# Patient Record
Sex: FEMALE | Race: WHITE | NOT HISPANIC OR LATINO | Employment: FULL TIME | URBAN - METROPOLITAN AREA
[De-identification: names, ages, dates, MRNs, and addresses within clinical notes are randomized per-mention and may not be internally consistent; named-entity substitution may affect disease eponyms.]

---

## 2017-12-31 ENCOUNTER — TRANSFERRED RECORDS (OUTPATIENT)
Dept: HEALTH INFORMATION MANAGEMENT | Facility: CLINIC | Age: 27
End: 2017-12-31

## 2017-12-31 LAB — PAP SMEAR - HIM PATIENT REPORTED: NEGATIVE

## 2018-12-03 ENCOUNTER — OFFICE VISIT (OUTPATIENT)
Dept: FAMILY MEDICINE | Facility: CLINIC | Age: 28
End: 2018-12-03
Payer: COMMERCIAL

## 2018-12-03 ENCOUNTER — RADIANT APPOINTMENT (OUTPATIENT)
Dept: ULTRASOUND IMAGING | Facility: CLINIC | Age: 28
End: 2018-12-03
Attending: NURSE PRACTITIONER
Payer: COMMERCIAL

## 2018-12-03 VITALS
DIASTOLIC BLOOD PRESSURE: 56 MMHG | OXYGEN SATURATION: 100 % | RESPIRATION RATE: 18 BRPM | TEMPERATURE: 98.9 F | HEART RATE: 87 BPM | SYSTOLIC BLOOD PRESSURE: 110 MMHG | HEIGHT: 65 IN | WEIGHT: 127 LBS | BODY MASS INDEX: 21.16 KG/M2

## 2018-12-03 DIAGNOSIS — N92.6 IRREGULAR MENSES: ICD-10-CM

## 2018-12-03 DIAGNOSIS — R22.2 LUMP OF SKIN OF BACK: Primary | ICD-10-CM

## 2018-12-03 DIAGNOSIS — R22.2 LUMP OF SKIN OF BACK: ICD-10-CM

## 2018-12-03 DIAGNOSIS — Z86.718 HISTORY OF DEEP VENOUS THROMBOSIS: ICD-10-CM

## 2018-12-03 PROCEDURE — 76705 ECHO EXAM OF ABDOMEN: CPT | Performed by: STUDENT IN AN ORGANIZED HEALTH CARE EDUCATION/TRAINING PROGRAM

## 2018-12-03 PROCEDURE — 99203 OFFICE O/P NEW LOW 30 MIN: CPT | Performed by: NURSE PRACTITIONER

## 2018-12-03 RX ORDER — DIPHENOXYLATE HYDROCHLORIDE AND ATROPINE SULFATE 2.5; .025 MG/1; MG/1
1 TABLET ORAL
Status: ON HOLD | COMMUNITY
Start: 2014-09-09 | End: 2024-08-28

## 2018-12-03 ASSESSMENT — PAIN SCALES - GENERAL: PAINLEVEL: MILD PAIN (3)

## 2018-12-03 NOTE — MR AVS SNAPSHOT
"              After Visit Summary   12/3/2018    Raven Puga    MRN: [id number]           Patient Information     Date Of Birth          1990        Visit Information        Provider Department      12/3/2018 12:00 PM Jagruti Sandoval NP [facility]        Today's Diagnoses     Lump of skin of back    -  1      Care Instructions    [phone]          Follow-ups after your visit        Future tests that were ordered for you today     Open Future Orders        Priority Expected Expires Ordered    US Head Neck Soft Tissue Routine  12/3/2019 12/3/2018            Who to contact     If you have questions or need follow up information about today's clinic visit or your schedule please contact [facility] directly at [phone].  Normal or non-critical lab and imaging results will be communicated to you by MyChart, letter or phone within 4 business days after the clinic has received the results. If you do not hear from us within 7 days, please contact the clinic through MyChart or phone. If you have a critical or abnormal lab result, we will notify you by phone as soon as possible.  Submit refill requests through [employer] or call your pharmacy and they will forward the refill request to us. Please allow 3 business days for your refill to be completed.          Additional Information About Your Visit        [facility] Information     [employer] lets you send messages to your doctor, view your test results, renew your prescriptions, schedule appointments and more. To sign up, go to [email or web address] . Click on \"Log in\" on the left side of the screen, which will take you to the Welcome page. Then click on \"Sign up Now\" on the right side of the page.     You will be asked to enter the access code listed below, as well as some personal information. Please follow the directions to create your username and password.     Your access code is: [email or web address]  Expires: 3/3/2019 12:50 PM     Your " "access code will  in 90 days. If you need help or a new code, please call your [facility] clinic or [phone].        Care EveryWhere ID     This is your Care EveryWhere ID. This could be used by other organizations to access your [facility] medical records  [id number]        Your Vitals Were     Pulse Temperature Respirations Height Pulse Oximetry BMI (Body Mass Index)    87 98.9  F (37.2  C) (Oral) 18 1.651 m (5' 5\") 100% 21.13 kg/m2       Blood Pressure from Last 3 Encounters:   18 110/56    Weight from Last 3 Encounters:   18 57.6 kg (127 lb)               Primary Care Provider Office Phone # Fax #    Nikolay Keen [phone]       [facility] [address]  [place] [address]        Equal Access to Services     [facility]: Hadii aad ku hadasho Soomaali, waaxda luqadaha, qaybta kaalmada adeegyada, waxay idiin hayaan adechrissy gerardarahai hendricksaan . So [facility] [phone].    ATENCIÓN: Si habla español, tiene a perez disposición servicios gratuitos de asistencia lingüística. Dorothy al [phone].    We comply with applicable federal civil rights laws and [place] laws. We do not discriminate on the basis of race, color, national origin, age, disability, sex, sexual orientation, or gender identity.            Thank you!     Thank you for choosing [facility]  for your care. Our goal is always to provide you with excellent care. Hearing back from our patients is one way we can continue to improve our services. Please take a few minutes to complete the written survey that you may receive in the mail after your visit with us. Thank you!             Your Updated Medication List - Protect others around you: Learn how to safely use, store and throw away your medicines at www.disposemymeds.org.          This list is accurate as of 12/3/18 12:50 PM.  Always use your most recent med list.                   Brand Name Dispense Instructions for use Diagnosis    " MULTI-VITAMINS Tabs      Take 1 tablet by mouth

## 2018-12-03 NOTE — Clinical Note
Please abstract the following data from this visit with this patient into the appropriate field in Epic:  Pap smear done on this date: 12/31/17 (approximately), by this group: [facility], results were normal.

## 2018-12-03 NOTE — PROGRESS NOTES
SUBJECTIVE:   Raven Puga is a 28 year old female who presents to clinic today for the following health issues:      Concern - lump in lower back to the LT of spine  Onset: 2-3 days ago    Description:   Dime sized lump on the LT side of spine, low back    Intensity: mild, moderate    Progression of Symptoms:  worsening    Accompanying Signs & Symptoms:  pain    Previous history of similar problem:   no    Precipitating factors:   Worsened by: unsure    Alleviating factors:  Improved by: nothing    Therapies Tried and outcome: nothing      Hx of blood clots in the past. On in leg and one in brain. Has had work up and everything was negative. Last one was in 2015 in brain. Was noted to be maybe hormone related. Noted lump in back, wants to be sure not blood clot. We talked how it could be simple cyst or lipoma, will get US.     Menses was very irregular. Went off birth control 6 months ago and no menses. Has not had period on her own since she was 17. First was on Patti, then low dose estrogen pill, then got her 2nd blood clot. Went off. Her gynecologist put her on IUD with just progesterone for a few years. Then took it out recently. Can sometimes notice small changes monthly with bloating, or vaginal discharge. Had an ultrasound, cysts on ovaries. She is looking to start getting pregnant after getting  next May 2019. We talked about her tracking her symptoms and if no menses in a few months return for medication help and possible further testing.     2010: thought was fluttering, wore heart monitor, was negative. Still has some palpitations at times.         Problem list and histories reviewed & adjusted, as indicated.  Additional history: as documented    Patient Active Problem List   Diagnosis     History of deep venous thrombosis     Irregular menses     History reviewed. No pertinent surgical history.    Social History   Substance Use Topics     Smoking status: Never Smoker     Smokeless tobacco:  "Never Used     Alcohol use Yes     History reviewed. No pertinent family history.      Current Outpatient Prescriptions   Medication Sig Dispense Refill     Multiple Vitamin (MULTI-VITAMINS) TABS Take 1 tablet by mouth       Allergies   Allergen Reactions     Penicillins Hives     Sulfa Drugs Hives       Reviewed and updated as needed this visit by clinical staff  Tobacco  Allergies  Meds  Problems  Med Hx  Surg Hx  Fam Hx  Soc Hx        Reviewed and updated as needed this visit by Provider  Allergies  Meds  Problems         ROS:  Constitutional,, cardiovascular, pulmonary, MS, back, otherwise systems are negative, except as otherwise noted.    OBJECTIVE:     /56 (BP Location: Right arm, Patient Position: Sitting, Cuff Size: Adult Regular)  Pulse 87  Temp 98.9  F (37.2  C) (Oral)  Resp 18  Ht 1.651 m (5' 5\")  Wt 57.6 kg (127 lb)  SpO2 100%  BMI 21.13 kg/m2  Body mass index is 21.13 kg/(m^2).  GENERAL: healthy, alert and no distress  RESP: lungs clear to auscultation - no rales, rhonchi or wheezes  CV: regular rate and rhythm, normal S1 S2, no S3 or S4, no murmur, click or rub  MS: no gross musculoskeletal defects noted  BACK: no CVA tenderness, no paralumbar tenderness  Skin: lower lumber, slightly left of spine very small, less than pea sized nodule slightly tender, firm to touch    Diagnostic Test Results:  none     ASSESSMENT/PLAN:         1. Lump of skin of back  Get US of back, make sure not DVT  - US Head Neck Soft Tissue; Future    2. Irregular menses  Monitor when feeling symptomatic. If no period in few months return for medication, possible US and/or labs. May need referral to OB    3. History of deep venous thrombosis  In leg and brain, last in 2015. Not on birth control now, not on anticoagulation at this time      FUTURE APPOINTMENTS:       - Follow-up visit in 1-2 months alexn    WELLINGTON Ortiz, NP-C  [facility]    "

## 2018-12-04 ENCOUNTER — TELEPHONE (OUTPATIENT)
Dept: FAMILY MEDICINE | Facility: CLINIC | Age: 28
End: 2018-12-04

## 2018-12-04 NOTE — TELEPHONE ENCOUNTER
This writer attempted to contact Raven on 12/04/18      Reason for call ultrasound results and left detailed message.      If patient calls back:   Send to NP if further questions.         WELLINGTON Ortiz, NP-C  [facility]

## 2021-06-02 LAB
HEPATITIS B SURFACE ANTIGEN (EXTERNAL): NEGATIVE
HIV1+2 AB SERPL QL IA: NEGATIVE
VDRL (SYPHILIS) (EXTERNAL): NONREACTIVE

## 2021-06-10 LAB — RUBELLA ANTIBODY IGG (EXTERNAL): NORMAL

## 2021-07-01 ENCOUNTER — TRANSFERRED RECORDS (OUTPATIENT)
Dept: HEALTH INFORMATION MANAGEMENT | Facility: CLINIC | Age: 31
End: 2021-07-01

## 2021-07-02 ENCOUNTER — MEDICAL CORRESPONDENCE (OUTPATIENT)
Dept: HEALTH INFORMATION MANAGEMENT | Facility: CLINIC | Age: 31
End: 2021-07-02

## 2021-07-02 ENCOUNTER — TRANSCRIBE ORDERS (OUTPATIENT)
Dept: MATERNAL FETAL MEDICINE | Facility: CLINIC | Age: 31
End: 2021-07-02

## 2021-07-02 DIAGNOSIS — O26.90 PREGNANCY RELATED CONDITION, ANTEPARTUM: Primary | ICD-10-CM

## 2021-07-06 ENCOUNTER — TELEPHONE (OUTPATIENT)
Dept: MATERNAL FETAL MEDICINE | Facility: CLINIC | Age: 31
End: 2021-07-06

## 2021-07-06 NOTE — TELEPHONE ENCOUNTER
Phone call back from pt re record request for consult. RANJANA set to pt. Pt aware once RANJANA is returned and records are obtained she will be called to scheduled an apt. States understanding. No further questions at this time. Maryam Bah RN

## 2021-07-06 NOTE — TELEPHONE ENCOUNTER
Phone call with message left for pt to call back re consult request. Need more information and records prior to scheduling her for consult. Left [facility] number to call back. Maryam Bah RN

## 2021-07-12 ENCOUNTER — TRANSCRIBE ORDERS (OUTPATIENT)
Dept: MATERNAL FETAL MEDICINE | Facility: CLINIC | Age: 31
End: 2021-07-12

## 2021-07-12 DIAGNOSIS — O26.90 PREGNANCY RELATED CONDITION, ANTEPARTUM: Primary | ICD-10-CM

## 2021-07-23 ENCOUNTER — PRE VISIT (OUTPATIENT)
Dept: MATERNAL FETAL MEDICINE | Facility: CLINIC | Age: 31
End: 2021-07-23

## 2021-07-23 DIAGNOSIS — Z78.9 CONCEIVED BY IN VITRO FERTILIZATION: ICD-10-CM

## 2021-07-24 NOTE — PROGRESS NOTES
Maternal-Fetal Medicine Consultation    Raven Puga  : 1990  MRN: [id number]    REFERRAL:  Raven Puga is a 31 year old sent by Dr. Singh from [facility] for MFM consultation.    HPI:  Raven Puga is a 31 year old  at 18w2d by IVF dating here for MFM consultation regarding history of dural sinus thrombosis and IVF pregnancy.    She is here with her , Salvatore.    Patient has a history of dural sinus thrombosis diagnosed 2014.  She presented with acute onset frontal headache after vigorous exercise.  She presented to [facility] in [facility], where she was studying physical therapy.  She underwent CT had and CTA, which showed dural sinus thrombosis, involving transverse sinus, torcula, straight sinus and superior sagittal sinus.  At this time, she was also noted to be on combined OCPs.  Given the extensive clot burden, she was admitted to the ICU and started on a heparin drip, which was later transitioned to warfarin.  She was evaluated by neurosurgery as well as hematology inpatient, and followed up as an outpatient.  She underwent thrombophilia testing that was negative for protein C, lupus anticoagulant, b2 glycoprotein, anticardiolipin, factor V Leiden, and prothrombin mutation. She also underwent antithrombin III testing which was mildly low at 80%.  She was treated for a total of 6 months of anticoagulation with warfarin, however, no lifelong anticoagulation was recommended.  She denies any other history significant for venous thromboembolism, though previously had a history of superficial clot in the lower extremity for which anticoagulation was not indicated.  Given the pregnancy and her history, she is currently on prophylactic Lovenox 40 mg daily.    This pregnancy was achieved via in vitro fertilization using her egg and her partner sperm.  The transfer to embryo underwent PGS, which was normal per her report. They also both underwent  carrier screening, which was normal. They underwent NIPT, which was low risk.     Prenatal Care:  Primary OB care this pregnancy has been with Dr. Singh    Obstetrics History:  OB History    Para Term  AB Living   1 0 0 0 0 0   SAB TAB Ectopic Multiple Live Births   0 0 0 0 0      # Outcome Date GA Lbr Jamaal/2nd Weight Sex Delivery Anes PTL Lv   1 Current                Past Medical History:  Past Medical History:   Diagnosis Date     Anemia, iron deficiency      Dural sinus thrombosis      Hypothyroidism        Past Surgical History:  Past Surgical History:   Procedure Laterality Date     Egg retrieval       HYSTEROSCOPY       WISDOM TOOTH EXTRACTION         Current Medications:  Prior to Admission medications    Medication Sig Last Dose Taking? Auth Provider   Multiple Vitamin (MULTI-VITAMINS) TABS Take 1 tablet by mouth   Reported, Patient       Allergies:  Penicillins and Sulfa drugs    Social History:   Occupation: Physical therapist at [employer]  Status:  to Salvatore  Denies use of alcohol, drugs or smoking.    Family History:  Paternal grandfather- lung cancer  Paternal grandmother-prostate cancer  Denies history of genetic disorders, preeclampsia, thromboembolic disease, bleeding disorders, developmental delay.    ROS:  10-point ROS negative except as in HPI     PHYSICAL EXAM:  /52   Pulse 67   Resp 16   LMP 2020   SpO2 100%   Deferred     Blood type A+, Antibody negative  Hgb 12.36  MCV 91  Plts 230  HBSAg NR  Treponemal Ab NR  HIV NR  Gonorrhea neg  Chlamydia neg  NIPT low risk      ASSESSMENT/PLAN:  Raven Puga is a 31 year old  at 18w2d by IVF here for MFM consultation regarding:     History of Dural Sinus Thrombosis  Pregnant and postpartum women have up to a five-fold increased risk for venous thromboembolism (VTE); they have higher rates of stasis, hypercoagulability and vascular damage (Virchow Triad). Decreased mobility may also play a role.   The  prevention of deep vein thrombosis (DVT) is paramount because pulmonary embolism (PE) is a leading cause of maternal death, responsible for 9% of maternal deaths.  The most important risk factor for VTE in pregnancy is a personal history of VTE, and up to 25% of cases of VTE in pregnancy are recurrent events.  The second most important risk factor is the presence of a thrombophilia, with up to 50% of women experiencing VTE in pregnancy having a thrombophilia.  ACOG recommends that all women with a VTE be evaluated for both antiphospholipid antibody syndrome and inherited thrombophilias.  These results might  of a future pregnancy.      Women with a history of VTE that is estrogen or pregnancy related or was idiopathic (i.e. unprovoked), in the absence of a thrombophilia, are managed with prophylactic anticoagulation during pregnancy and for 6 weeks postpartum.  For most patients enoxaparin 40 mg daily is sufficient. We discussed that in Ms. 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 We discussed that in this situation, options for prophylaxis include prophylactic, intermediate and therapeutic dosing of Lovenox during the pregnancy and postpartum. She is currently on prophylactic dosing of Lovenox and feels comfortable continuing with this dosing, which is reasonable.     Most expert recommend waiting 10-12 hours after a dose of LMWH before placing regional anesthesia.  Similarly, a dose should be given no sooner than four hours after removal of an epidural and 12 hours after the regional anesthesia placement.  The exact timing should be discussed with the anesthesiologist performing the procedure. From an obstetrics stand point, assuming no significant bleeding anticoagulation can usually be resumed 12 hours after a  birth or six hours after a vaginal birth.  Again, pneumatic compression devices are  recommended.  Breastfeeding is safe on enoxaparin.  Avoidance of estrogen containing birth control is recommended.      In-Vitro Fertilization  The use of IVF, with and without ICSI, has been associated with an increased risk for congenital anomalies (specifically cardiac), hypertensive disorders, preeclampsia, placental abruption, placenta previa and other placental abnormalities, small for gestational age and  delivery.     ICSI has also been associated with imprinting disorders, such as Erika-Wiedemann and Angelman's Syndromes, however there are no studies confirming these concerns as a causal relationship.     While the association between IVF and these adverse outcomes raise some concern, it is important to note that the literature is contradictory on this subject and the chances of having a healthy child conceived with ART are overall extremely high.    Hypothyroidism  Thyroid requirements increase in pregnancy due to increases in metabolism and thyroid binding globulin.  Thyroid function tests, in particular TSH, should be followed closely to ensure adequate treatment.  Hypothyroidism has been associated with higher pregnancy complication rates including miscarriage, preeclampsia, abruption, low birth weight and stillbirth.  Untreated hypothyroidism has also been associated with adverse fetal neurological development, but well treated hypothyroidism (ie euthyroid state) carries an excellent prognosis for mother and baby.      Recommendations:  History of Dural Sinus Thrombosis    After discussion of management options in pregnancy, patient elects to continue enoxaparin 40 mg daily     Continuation of LMWH for at least 6 weeks postpartum    Consultation with anesthesiology if regional anesthesia desired    Sequential compression devices until the patient is fully ambulatory and has resumed anticoagulation therapy    Prompt evaluation if new symptoms concern for VTE present    Estrogen containing  contraceptives are not advised in this patient given her history.    In-Vitro Fertilization    We recommend offering routine aneuploidy screening regardless of whether PGS is completed    Fetal echocardiogram at 22 weeks    Close monitoring for preeclampsia    Fetal testing and timing of delivery per routine obstetric guidelines    Recommend low dose aspirin for preeclampsia prophylaxis, given IVF pregnancy and nulliparity. Can be started as soon as possible, but prior to 28 weeks' gestation.     Hypothyroidism    Maintain euthyroidism via treatment with levothyroxine, aimed at keeping the TSH at the desired range by trimester; dose requirements may increase by as much as 30-50%.       TSH should be checked q4-6 weeks in the first half of pregnancy and adjusted to trimester specific norms; it can be checked less often in the second half of pregnancy as long as dose adjustments are not necessary.      First trimester: 0.1 to 2.5 mIU/L    Second trimester: 0.2 to 3 mIU/L    Third trimester: 0.3 to 3 mIU/L     fetal testing and timing of delivery per routine obstetric guidelines if euthyroidism is maintained    After delivery levothyroxine can be reduced to pre-pregnancy levels, but serum TSH should be monitored 4-6 weeks later to confirm appropriate dosing.      The patient was seen and evaluated with Dr. Perez.    Thank you for allowing us to participate in the care of your patient. Please do not hesitate to contact us if you have further questions regarding the management of your patient.       Janak Sylvester MD  Maternal Fetal Medicine Fellow    [facility] Attending Attestation  I have seen and evaluated the patient with Dr. Sylvester. I reviewed her imaging/labs and chart and agree with the above documented assessment and plan.  I spent a total of 60 minutes face-to-face with Ms. Matt and her partner during today's office visit. Over 50% of this time was spent counseling the patient and/or coordinating care.  Please see her note for specific details; I have made the necessary edits/additions.  The patient was also seen for an ultrasound in the [facility] at the [facility] today.  For a detailed report of the ultrasound examination, please see the ultrasound report which can be found under the imaging tab.  Rosa Perez MD  Maternal Fetal Medicine Physician

## 2021-07-26 NOTE — PROGRESS NOTES
[facility]  Genetic Counseling Consult    Patient:  Raven Puga YOB: 1990   Date of Service:  21      Raven Puga was seen at the [facility] for genetic consultation as part of her MFM consultation appointment for comprehensive ultrasound and evaluation. The indication for genetic counseling is IVF pregnancy with history dural sinus thrombosis. Raven was accomapnied to today's consult by her , Salvatore.       Impression/Plan:   1. Raven had a cell-free fetal DNA test earlier in pregnancy, which is reportedly normal. A copy of the results were not available for our review in person today, however Raven reports they were low risk, female. We reviewed the option of proceeding with genetic amniocentesis in the current pregnancy. Raven declined today.     2. Raven had a comprehensive (level II) ultrasound today.  Please see the ultrasound report for further details.    3. Raven had an MFM consult today. Please see Dr. Janak Sylvester and Dr. Rosa Perez's documentation for details regarding pregnancy management and recommendation for Raven.     Pregnancy History:   /Parity:    Age at Delivery: 31 year old  ELI: 2021, by Ultrasound  Gestational Age: 18w2d    No significant complications or exposures were reported in the current pregnancy.    Raven current takes lovenox, prenatal vitamins, magnesium, and levothyroxine in pregnancy    Raven's pregnancy was conceived via frozen day 5 embryo transfer on 21 using her egg and her partner's sperm. Raven Puga was 30years old when her egg was retrieved. The embryo did undergo preimplantation genetic screening for aneuploidy (PGT-A) prior to transfer. 7 of the 11 embryos the couple had underwent PGT-A and were found to be euploid (this pregnancy included- 46, XX).    o This is the first pregnancy for Raven and Salvatore  o We discussed that fetal  echocardiograms are typically recommended for In Vitro Fertilization (IVF) pregnancies. The average pregnancy has a 0.5-1.0% chance of a congenital heart defect. However, studies suggest an increased chance for a heart defect for IVF pregnancies, with estimates ranging from 1-3.3%. Fetal echocardiograms are typically performed at 20 to 24 weeks gestation.  Raven verbalized understanding.     Medical History:   Raven robbins reported medical history is significant for dural sinus thrombosis diagnosed in 2014. She was treated for six months with Warfarin, however since no diagnosis of a thrombophilia was formerly made, she was discontinued on this medication. Currently, Raven takes Lovenox in pregnancy. Also, at age 21, Raven was found to have a superficial DVT in her left calf while she was on birth control. She reports that physicians were not concerned regarding this history and she was not actively treated for it at the time.        Family History:   A three-generation pedigree was obtained, and is scanned under the  Media  tab.     The following significant findings were reported by Raven and Salvatore:    Salvatore: he is 31 years old and in good health. He has no children with previous partners.     Salvatore's mother: diagnosed with breast cancer in her 40s, she is currently 60 years old and cancer-free. She may have undergone genetic testing, however the couple are not aware of the results.     Salvatore's maternal first-cousin: diagnosed with ovarian cancer at age 25, she is alive and well at age 28 today s/p surgical resection. Raven and Salvatore confirmed today that her cancer was ovarian in origin, and not cervical or endometrial cancer. She has not undergone genetic testing to the best of the couple's knowledge.     Salvatore's maternal grandmother: diagnosed with breast cancer in her 60s.     Otherwise, the reported family history is negative for multiple miscarriages, stillbirths, birth defects, intellectual disabilities, known genetic  "conditions, and consanguinity.    We briefly discussed the family history of cancer. Cancer most often occurs by chance, however some families seem to develop cancer more frequently than expected. Everyone has a risk to develop cancer, but individuals may be at an increased risk to develop cancer based on their family history. We discussed that early onset ovarian and breast cancer can be associated with inherited cancer predisposition syndromes. Genetic counseling is available for cancer syndromes. Cancer family history, even without genetic testing, can change cancer screening recommendations for family members and aid in insurance coverage for access to them as well. The most informative individuals to complete cancer genetic counseling and genetic testing are those with a personal history of cancer or those closely related to the affected individuals. If the family wants more information they can contact the [facility] Cancer Risk Management Program (1-437.566.5915). Physicians can also make referrals at [email or web address]-program or, if within the [employer] system, through [facility] referral for \"Cancer Risk Mgmt/Cancer Genetic Counseling\".        Carrier Screening:   The patient reports that she and the father of the pregnancy have  ancestry:     Cystic fibrosis is an autosomal recessive genetic condition that occurs with increased frequency in individuals of  ancestry and carrier screening for this condition is available.  In addition,  screening in the [facility] includes cystic fibrosis.      Expanded carrier screening for mutations in a large panel of genes associated with autosomal recessive conditions including cystic fibrosis, spinal muscular atrophy, and others, is now available.      Raven and Salvatore underwent expanded carrier screening as part of their IVF work-up. The couple report they were both found to be carriers for " different autosomal recessive conditions, but were NOT identified as carriers for the same one. A copy of the couple's results were not available for review today.        Risk Assessment for Chromosome Conditions:   We explained that the risk for fetal chromosome abnormalities increases with maternal age. We discussed specific features of common chromosome abnormalities, including Down syndrome, trisomy 13, trisomy 18, and sex chromosome trisomies. Because the patient underwent egg retrieval at age 30, all embryos had the following risks to present wtih chromosome anomalies:       - At age 30 at midtrimester, the risk to have a baby with Down syndrome is 1 in 597.    - At age 30 at midtrimester, the risk to have a baby with any chromosome abnormality is 1 in 299.     - At age 30 at delivery, the risk to have a baby with Down syndrome is 1 in 909.     - At age 30 at delivery, the risk to have a baby with any chromosome abnormality is 1 in 355.     We reviewed the benefits and limitations of PGT-A at today's consult. The American College of Obstetricians and Gynecologists (ACOG) states that PGT-A may still have false positives and false negatives, therefore, couples that completed PGT-A should still be offered the same screening and diagnostic offerings as all other pregnancies. After discussing diagnostic and screening prenatal testing options, Raven has elected to forego genetic amniocentesis at this time. She previously underwent NIPT in the pregnancy and reports receiving low risk screening results.     Non-invasive Prenatal Testing (NIPT)    Maternal plasma cell-free DNA testing    Screens for fetal trisomy 21, trisomy 13, trisomy 18, and sex chromosome aneuploidy    First trimester ultrasound with nuchal translucency and nasal bone assessment was not performed in this pregnancy, to our knowledge.    Raven had an NIPT test earlier in pregnancy; we reviewed the results today, which are reportedly normal for  chromosome 13, chromosome 18 and chromosome 21 (no aneuploidy detected)    No sex chromosome aneuploidy was reported to be detected, either    Given the accuracy of this test, these results greatly decrease the chance for certain fetal chromosome abnormalities    We discussed the limitations of normal NIPT results    MSAFP (after 15 weeks for open neural tube defect screening) has not been completed in the current pregnancy, to our knowledge       Testing Options:   We discussed the following options:     Non-invasive Prenatal Testing (NIPT)    Maternal plasma cell-free DNA testing; first trimester ultrasound with nuchal translucency and nasal bone assessment is recommended, when appropriate    Screens for fetal trisomy 21, trisomy 13, trisomy 18, and sex chromosome aneuploidy    Cannot screen for open neural tube defects; maternal serum AFP after 15 weeks is recommended     Genetic Amniocentesis    Invasive procedure typically performed in the second trimester by which amniotic fluid is obtained for the purpose of chromosome analysis and/or other prenatal genetic analysis    Diagnostic results; >99% sensitivity for fetal chromosome abnormalities    AFAFP measurement tests for open neural tube defects     Comprehensive (Level II) ultrasound: Detailed ultrasound performed between 18-22 weeks gestation to screen for major birth defects and markers for aneuploidy.          Fetal echocardiogram is recommended between 22 and 24 weeks to screen for congenital heart defects due to IVF gestation. Fetal echocardiogram cannot definitively diagnose or exclude the presence of all congenital heart defects.      We reviewed the benefits and limitations of this testing.  Screening tests provide a risk assessment specific to the pregnancy for certain fetal chromosome abnormalities, but cannot definitively diagnose or exclude a fetal chromosome abnormality.  Follow-up genetic counseling and consideration of diagnostic testing is  recommended with any abnormal screening result.     Diagnostic tests carry inherent risks- including risk of miscarriage- that require careful consideration.  These tests can detect fetal chromosome abnormalities with greater than 99% certainty.  Results can be compromised by maternal cell contamination or mosaicism, and are limited by the resolution of cytogenetic G-banding technology.  There is no screening nor diagnostic test that can detect all forms of birth defects or mental disability.    It was a pleasure to be involved with Raven robbins care. Face-to-face time of the meeting was 40 minutes.    Chel Nur MS, [facility]  Genetic Counselor  [facility]  Maternal Fetal Medicine  Ph: [phone] ([place])  Ph: [phone] ([facility])

## 2021-07-27 ENCOUNTER — OFFICE VISIT (OUTPATIENT)
Dept: MATERNAL FETAL MEDICINE | Facility: CLINIC | Age: 31
End: 2021-07-27
Attending: OBSTETRICS & GYNECOLOGY
Payer: COMMERCIAL

## 2021-07-27 ENCOUNTER — HOSPITAL ENCOUNTER (OUTPATIENT)
Dept: ULTRASOUND IMAGING | Facility: CLINIC | Age: 31
End: 2021-07-27
Attending: OBSTETRICS & GYNECOLOGY
Payer: COMMERCIAL

## 2021-07-27 VITALS
HEART RATE: 67 BPM | RESPIRATION RATE: 16 BRPM | DIASTOLIC BLOOD PRESSURE: 52 MMHG | OXYGEN SATURATION: 100 % | SYSTOLIC BLOOD PRESSURE: 100 MMHG

## 2021-07-27 DIAGNOSIS — O35.9XX0 SUSPECTED FETAL ANOMALY, ANTEPARTUM, SINGLE OR UNSPECIFIED FETUS: ICD-10-CM

## 2021-07-27 DIAGNOSIS — O99.119 MATERNAL ANTITHROMBIN III DEFICIENCY COMPLICATING PREGNANCY (H): Primary | ICD-10-CM

## 2021-07-27 DIAGNOSIS — Z86.718 HISTORY OF VENOUS THROMBOEMBOLISM: ICD-10-CM

## 2021-07-27 DIAGNOSIS — O26.90 PREGNANCY RELATED CONDITION, ANTEPARTUM: ICD-10-CM

## 2021-07-27 DIAGNOSIS — O09.812 PREGNANCY RESULTING FROM IN VITRO FERTILIZATION IN SECOND TRIMESTER: ICD-10-CM

## 2021-07-27 DIAGNOSIS — Z36.9 PRENATAL SCREENING ENCOUNTER: ICD-10-CM

## 2021-07-27 DIAGNOSIS — Z86.718 PERSONAL HISTORY OF DVT (DEEP VEIN THROMBOSIS): ICD-10-CM

## 2021-07-27 DIAGNOSIS — D68.59 MATERNAL ANTITHROMBIN III DEFICIENCY COMPLICATING PREGNANCY (H): Primary | ICD-10-CM

## 2021-07-27 DIAGNOSIS — O09.812 PREGNANCY RESULTING FROM IN VITRO FERTILIZATION IN SECOND TRIMESTER: Primary | ICD-10-CM

## 2021-07-27 DIAGNOSIS — I66.9 BRAIN EMBOLISM AND THROMBOSIS: ICD-10-CM

## 2021-07-27 PROCEDURE — 76811 OB US DETAILED SNGL FETUS: CPT

## 2021-07-27 PROCEDURE — 76811 OB US DETAILED SNGL FETUS: CPT | Mod: 26 | Performed by: OBSTETRICS & GYNECOLOGY

## 2021-07-27 PROCEDURE — 96040 HC GENETIC COUNSELING, EACH 30 MINUTES: CPT

## 2021-07-27 PROCEDURE — 99205 OFFICE O/P NEW HI 60 MIN: CPT | Mod: 25 | Performed by: OBSTETRICS & GYNECOLOGY

## 2021-07-27 NOTE — NURSING NOTE
Raven here for MFM consult, GC consult and Level 2 ultrasound due to pregnancy c/b history of brain VTE and IVF pregnancy.  Patient accompanied by  Salvatore.  VSS - see flowsheet.  Medication list reviewed.  Dr. Perez and Dr. Sylvester in to discuss plan of care for remainder of pregnancy.  Raven to have a fetal echo with pediatric cardiology in 3-4 weeks.  Orders placed for echo.  Patient discharge stable and ambulatory accompanied by .

## 2021-08-26 ENCOUNTER — OFFICE VISIT (OUTPATIENT)
Dept: CARDIOLOGY | Facility: CLINIC | Age: 31
End: 2021-08-26
Payer: COMMERCIAL

## 2021-08-26 ENCOUNTER — HOSPITAL ENCOUNTER (OUTPATIENT)
Dept: CARDIOLOGY | Facility: CLINIC | Age: 31
Discharge: HOME | End: 2021-08-26
Attending: OBSTETRICS & GYNECOLOGY | Admitting: OBSTETRICS & GYNECOLOGY
Payer: COMMERCIAL

## 2021-08-26 DIAGNOSIS — O26.90 PREGNANCY RELATED CONDITION, ANTEPARTUM: ICD-10-CM

## 2021-08-26 DIAGNOSIS — O26.90 PREGNANCY RELATED CONDITION, ANTEPARTUM: Primary | ICD-10-CM

## 2021-08-26 PROCEDURE — 76827 ECHO EXAM OF FETAL HEART: CPT | Mod: 26 | Performed by: PEDIATRICS

## 2021-08-26 PROCEDURE — 93325 DOPPLER ECHO COLOR FLOW MAPG: CPT | Mod: 26 | Performed by: PEDIATRICS

## 2021-08-26 PROCEDURE — 76827 ECHO EXAM OF FETAL HEART: CPT

## 2021-08-26 PROCEDURE — 99203 OFFICE O/P NEW LOW 30 MIN: CPT | Mod: 25 | Performed by: PEDIATRICS

## 2021-08-26 PROCEDURE — 76825 ECHO EXAM OF FETAL HEART: CPT | Mod: 26 | Performed by: PEDIATRICS

## 2021-08-26 PROCEDURE — 93325 DOPPLER ECHO COLOR FLOW MAPG: CPT

## 2021-08-26 NOTE — PROGRESS NOTES
Fetal Cardiology Consultation    Patient:  Raven Matt MRN:  [id number]   YOB: 1990 Age:  31 year old   Date of Visit:  8/26/2021 PCP:  Nikolay Keen MD   ELI: 12/26/2021, by Ultrasound EGA: 22w4d weeks     Dear Dr. Perez:    I had the pleasure of seeing Raven Matt at the [facility] in [place] on 8/26/2021 in consultation for fetal echocardiography results. She presented today accompanied by her partner. As you know, she is a 31 year old female with suspected fetal cardiac anomaly on obstetrical ultrasound (possibly abnormal 3VTV).    The fetal echocardiogram was normal. Normal fetal cardiac anatomy. Normal right and left ventricular size and function without hypertrophy. No evidence of diastolic dysfunction. No pericardial effusion. No arrhythmia.     I reviewed and interpreted the fetal echocardiogram today. I discussed the normal results with Ms. Matt and her partner. While these results are normal, it is important to note that fetal echocardiography cannot exclude small atrial or ventricular septal defects, persistent ductus arteriosus, mild coarctation of the aorta, partial anomalous pulmonary venous return, minor anatomic valve anomalies, or coronary artery anomalies.     Thank you for allowing me to participate in Ms. Matt's care. Please don't hesitate to contact me or the Fetal Cardiology team at [facility] with any questions or concerns.     I spent a total of 25 minutes on the date of the encounter doing chart review, patient history, documentation, counseling, and coordinating care.    Zachariah Ennis MD  Pediatric Cardiology  [facility]  Phone [phone]    Review of the result(s) of each unique test - fetal echocardiogram

## 2021-11-22 ENCOUNTER — TRANSCRIBE ORDERS (OUTPATIENT)
Dept: PHARMACY | Facility: CLINIC | Age: 31
End: 2021-11-22
Payer: COMMERCIAL

## 2021-11-22 DIAGNOSIS — O99.019 ANEMIA OF PREGNANCY: Primary | ICD-10-CM

## 2021-11-22 RX ORDER — HEPARIN SODIUM (PORCINE) LOCK FLUSH IV SOLN 100 UNIT/ML 100 UNIT/ML
5 SOLUTION INTRAVENOUS
Status: CANCELLED | OUTPATIENT
Start: 2021-11-23

## 2021-11-22 RX ORDER — EPINEPHRINE 1 MG/ML
0.3 INJECTION, SOLUTION INTRAMUSCULAR; SUBCUTANEOUS EVERY 5 MIN PRN
Status: CANCELLED | OUTPATIENT
Start: 2021-11-23

## 2021-11-22 RX ORDER — METHYLPREDNISOLONE SODIUM SUCCINATE 125 MG/2ML
125 INJECTION, POWDER, LYOPHILIZED, FOR SOLUTION INTRAMUSCULAR; INTRAVENOUS
Status: CANCELLED
Start: 2021-11-23

## 2021-11-22 RX ORDER — MEPERIDINE HYDROCHLORIDE 25 MG/ML
25 INJECTION INTRAMUSCULAR; INTRAVENOUS; SUBCUTANEOUS EVERY 30 MIN PRN
Status: CANCELLED | OUTPATIENT
Start: 2021-11-23

## 2021-11-22 RX ORDER — ALBUTEROL SULFATE 0.83 MG/ML
2.5 SOLUTION RESPIRATORY (INHALATION)
Status: CANCELLED | OUTPATIENT
Start: 2021-11-23

## 2021-11-22 RX ORDER — NALOXONE HYDROCHLORIDE 0.4 MG/ML
0.2 INJECTION, SOLUTION INTRAMUSCULAR; INTRAVENOUS; SUBCUTANEOUS
Status: CANCELLED | OUTPATIENT
Start: 2021-11-23

## 2021-11-22 RX ORDER — HEPARIN SODIUM,PORCINE 10 UNIT/ML
5 VIAL (ML) INTRAVENOUS
Status: CANCELLED | OUTPATIENT
Start: 2021-11-23

## 2021-11-22 RX ORDER — DIPHENHYDRAMINE HYDROCHLORIDE 50 MG/ML
50 INJECTION INTRAMUSCULAR; INTRAVENOUS
Status: CANCELLED
Start: 2021-11-23

## 2021-11-22 RX ORDER — ALBUTEROL SULFATE 90 UG/1
1-2 AEROSOL, METERED RESPIRATORY (INHALATION)
Status: CANCELLED
Start: 2021-11-23

## 2021-11-24 DIAGNOSIS — O99.019 ANTEPARTUM ANEMIA: Primary | ICD-10-CM

## 2021-12-01 DIAGNOSIS — Z11.59 SPECIAL SCREENING EXAMINATION FOR VIRAL DISEASE: Primary | ICD-10-CM

## 2021-12-01 DIAGNOSIS — D50.9 IRON DEFICIENCY ANEMIA, UNSPECIFIED: Primary | ICD-10-CM

## 2021-12-01 DIAGNOSIS — O99.019 ANTEPARTUM ANEMIA: ICD-10-CM

## 2021-12-01 DIAGNOSIS — T45.4X5A SODIUM IRON EDETATE ADVERSE REACTION: ICD-10-CM

## 2021-12-01 RX ORDER — DIPHENHYDRAMINE HYDROCHLORIDE 50 MG/ML
50 INJECTION INTRAMUSCULAR; INTRAVENOUS
Status: CANCELLED
Start: 2021-12-01

## 2021-12-01 RX ORDER — HEPARIN SODIUM (PORCINE) LOCK FLUSH IV SOLN 100 UNIT/ML 100 UNIT/ML
5 SOLUTION INTRAVENOUS
Status: CANCELLED | OUTPATIENT
Start: 2021-12-01

## 2021-12-01 RX ORDER — METHYLPREDNISOLONE SODIUM SUCCINATE 125 MG/2ML
125 INJECTION, POWDER, LYOPHILIZED, FOR SOLUTION INTRAMUSCULAR; INTRAVENOUS
Status: CANCELLED
Start: 2021-12-01

## 2021-12-01 RX ORDER — MEPERIDINE HYDROCHLORIDE 25 MG/ML
25 INJECTION INTRAMUSCULAR; INTRAVENOUS; SUBCUTANEOUS EVERY 30 MIN PRN
Status: CANCELLED | OUTPATIENT
Start: 2021-12-01

## 2021-12-01 RX ORDER — ALBUTEROL SULFATE 90 UG/1
1-2 AEROSOL, METERED RESPIRATORY (INHALATION)
Status: CANCELLED
Start: 2021-12-01

## 2021-12-01 RX ORDER — NALOXONE HYDROCHLORIDE 0.4 MG/ML
0.2 INJECTION, SOLUTION INTRAMUSCULAR; INTRAVENOUS; SUBCUTANEOUS
Status: CANCELLED | OUTPATIENT
Start: 2021-12-01

## 2021-12-01 RX ORDER — ALBUTEROL SULFATE 0.83 MG/ML
2.5 SOLUTION RESPIRATORY (INHALATION)
Status: CANCELLED | OUTPATIENT
Start: 2021-12-01

## 2021-12-01 RX ORDER — EPINEPHRINE 1 MG/ML
0.3 INJECTION, SOLUTION INTRAMUSCULAR; SUBCUTANEOUS EVERY 5 MIN PRN
Status: CANCELLED | OUTPATIENT
Start: 2021-12-01

## 2021-12-01 RX ORDER — HEPARIN SODIUM,PORCINE 10 UNIT/ML
5 VIAL (ML) INTRAVENOUS
Status: CANCELLED | OUTPATIENT
Start: 2021-12-01

## 2021-12-02 ENCOUNTER — LAB (OUTPATIENT)
Dept: URGENT CARE | Facility: URGENT CARE | Age: 31
End: 2021-12-02
Attending: OBSTETRICS & GYNECOLOGY
Payer: COMMERCIAL

## 2021-12-02 DIAGNOSIS — Z11.59 SPECIAL SCREENING EXAMINATION FOR VIRAL DISEASE: ICD-10-CM

## 2021-12-02 PROCEDURE — U0003 INFECTIOUS AGENT DETECTION BY NUCLEIC ACID (DNA OR RNA); SEVERE ACUTE RESPIRATORY SYNDROME CORONAVIRUS 2 (SARS-COV-2) (CORONAVIRUS DISEASE [COVID-19]), AMPLIFIED PROBE TECHNIQUE, MAKING USE OF HIGH THROUGHPUT TECHNOLOGIES AS DESCRIBED BY CMS-2020-01-R: HCPCS

## 2021-12-02 PROCEDURE — U0005 INFEC AGEN DETEC AMPLI PROBE: HCPCS

## 2021-12-03 LAB — SARS-COV-2 RNA RESP QL NAA+PROBE: NEGATIVE

## 2021-12-08 ENCOUNTER — INFUSION THERAPY VISIT (OUTPATIENT)
Dept: INFUSION THERAPY | Facility: CLINIC | Age: 31
End: 2021-12-08
Attending: OBSTETRICS & GYNECOLOGY
Payer: COMMERCIAL

## 2021-12-08 VITALS — HEART RATE: 70 BPM | RESPIRATION RATE: 16 BRPM | SYSTOLIC BLOOD PRESSURE: 115 MMHG | DIASTOLIC BLOOD PRESSURE: 69 MMHG

## 2021-12-08 DIAGNOSIS — T45.4X5A SODIUM IRON EDETATE ADVERSE REACTION: ICD-10-CM

## 2021-12-08 DIAGNOSIS — D50.9 IRON DEFICIENCY ANEMIA, UNSPECIFIED: Primary | ICD-10-CM

## 2021-12-08 DIAGNOSIS — O99.019 ANTEPARTUM ANEMIA: ICD-10-CM

## 2021-12-08 PROCEDURE — 250N000011 HC RX IP 250 OP 636: Performed by: OBSTETRICS & GYNECOLOGY

## 2021-12-08 PROCEDURE — 96365 THER/PROPH/DIAG IV INF INIT: CPT

## 2021-12-08 PROCEDURE — 258N000003 HC RX IP 258 OP 636: Performed by: OBSTETRICS & GYNECOLOGY

## 2021-12-08 PROCEDURE — 96366 THER/PROPH/DIAG IV INF ADDON: CPT

## 2021-12-08 RX ORDER — ALBUTEROL SULFATE 90 UG/1
1-2 AEROSOL, METERED RESPIRATORY (INHALATION)
Status: CANCELLED
Start: 2021-12-10

## 2021-12-08 RX ORDER — HEPARIN SODIUM (PORCINE) LOCK FLUSH IV SOLN 100 UNIT/ML 100 UNIT/ML
5 SOLUTION INTRAVENOUS
Status: CANCELLED | OUTPATIENT
Start: 2021-12-10

## 2021-12-08 RX ORDER — MEPERIDINE HYDROCHLORIDE 25 MG/ML
25 INJECTION INTRAMUSCULAR; INTRAVENOUS; SUBCUTANEOUS EVERY 30 MIN PRN
Status: CANCELLED | OUTPATIENT
Start: 2021-12-10

## 2021-12-08 RX ORDER — HEPARIN SODIUM,PORCINE 10 UNIT/ML
5 VIAL (ML) INTRAVENOUS
Status: CANCELLED | OUTPATIENT
Start: 2021-12-10

## 2021-12-08 RX ORDER — EPINEPHRINE 1 MG/ML
0.3 INJECTION, SOLUTION INTRAMUSCULAR; SUBCUTANEOUS EVERY 5 MIN PRN
Status: CANCELLED | OUTPATIENT
Start: 2021-12-10

## 2021-12-08 RX ORDER — LEVOTHYROXINE SODIUM 50 UG/1
TABLET ORAL
Status: ON HOLD | COMMUNITY
Start: 2021-12-01 | End: 2024-08-28

## 2021-12-08 RX ORDER — METHYLPREDNISOLONE SODIUM SUCCINATE 125 MG/2ML
125 INJECTION, POWDER, LYOPHILIZED, FOR SOLUTION INTRAMUSCULAR; INTRAVENOUS
Status: CANCELLED
Start: 2021-12-10

## 2021-12-08 RX ORDER — NALOXONE HYDROCHLORIDE 0.4 MG/ML
0.2 INJECTION, SOLUTION INTRAMUSCULAR; INTRAVENOUS; SUBCUTANEOUS
Status: CANCELLED | OUTPATIENT
Start: 2021-12-10

## 2021-12-08 RX ORDER — ALBUTEROL SULFATE 0.83 MG/ML
2.5 SOLUTION RESPIRATORY (INHALATION)
Status: CANCELLED | OUTPATIENT
Start: 2021-12-10

## 2021-12-08 RX ORDER — DIPHENHYDRAMINE HYDROCHLORIDE 50 MG/ML
50 INJECTION INTRAMUSCULAR; INTRAVENOUS
Status: CANCELLED
Start: 2021-12-10

## 2021-12-08 RX ADMIN — IRON SUCROSE 300 MG: 20 INJECTION, SOLUTION INTRAVENOUS at 14:26

## 2021-12-08 RX ADMIN — SODIUM CHLORIDE 250 ML: 9 INJECTION, SOLUTION INTRAVENOUS at 14:26

## 2021-12-08 NOTE — PROGRESS NOTES
Infusion Nursing Note:  Raven Matt presents today for Venofer 300mg.    Patient seen by provider today: No   present during visit today: Not Applicable.    Note: N/A.      Intravenous Access:  Peripheral IV placed.    Treatment Conditions:  Not Applicable.      Post Infusion Assessment:  Patient tolerated infusion without incident.  Blood return noted pre and post infusion.  Site patent and intact, free from redness, edema or discomfort.  No evidence of extravasations.  Access discontinued per protocol.       Discharge Plan:   Discharge instructions reviewed with: Patient.  Patient and/or family verbalized understanding of discharge instructions and all questions answered.  AVS to patient via [employer].  Patient will return 12/13/21 for next appointment.   Patient discharged in stable condition accompanied by: self.  Departure Mode: Ambulatory.      Guille Valera RN

## 2021-12-13 ENCOUNTER — INFUSION THERAPY VISIT (OUTPATIENT)
Dept: INFUSION THERAPY | Facility: CLINIC | Age: 31
End: 2021-12-13
Attending: OBSTETRICS & GYNECOLOGY
Payer: COMMERCIAL

## 2021-12-13 VITALS
RESPIRATION RATE: 16 BRPM | TEMPERATURE: 99 F | SYSTOLIC BLOOD PRESSURE: 99 MMHG | DIASTOLIC BLOOD PRESSURE: 64 MMHG | HEART RATE: 75 BPM | OXYGEN SATURATION: 96 %

## 2021-12-13 DIAGNOSIS — T45.4X5A SODIUM IRON EDETATE ADVERSE REACTION: ICD-10-CM

## 2021-12-13 DIAGNOSIS — D50.9 IRON DEFICIENCY ANEMIA, UNSPECIFIED: Primary | ICD-10-CM

## 2021-12-13 DIAGNOSIS — O99.019 ANTEPARTUM ANEMIA: ICD-10-CM

## 2021-12-13 PROCEDURE — 250N000011 HC RX IP 250 OP 636: Performed by: OBSTETRICS & GYNECOLOGY

## 2021-12-13 PROCEDURE — 96365 THER/PROPH/DIAG IV INF INIT: CPT

## 2021-12-13 PROCEDURE — 258N000003 HC RX IP 258 OP 636: Performed by: OBSTETRICS & GYNECOLOGY

## 2021-12-13 RX ORDER — HEPARIN SODIUM,PORCINE 10 UNIT/ML
5 VIAL (ML) INTRAVENOUS
Status: CANCELLED | OUTPATIENT
Start: 2021-12-14

## 2021-12-13 RX ORDER — ALBUTEROL SULFATE 90 UG/1
1-2 AEROSOL, METERED RESPIRATORY (INHALATION)
Status: CANCELLED
Start: 2021-12-14

## 2021-12-13 RX ORDER — HEPARIN SODIUM (PORCINE) LOCK FLUSH IV SOLN 100 UNIT/ML 100 UNIT/ML
5 SOLUTION INTRAVENOUS
Status: CANCELLED | OUTPATIENT
Start: 2021-12-14

## 2021-12-13 RX ORDER — NALOXONE HYDROCHLORIDE 0.4 MG/ML
0.2 INJECTION, SOLUTION INTRAMUSCULAR; INTRAVENOUS; SUBCUTANEOUS
Status: CANCELLED | OUTPATIENT
Start: 2021-12-14

## 2021-12-13 RX ORDER — ALBUTEROL SULFATE 0.83 MG/ML
2.5 SOLUTION RESPIRATORY (INHALATION)
Status: CANCELLED | OUTPATIENT
Start: 2021-12-14

## 2021-12-13 RX ORDER — METHYLPREDNISOLONE SODIUM SUCCINATE 125 MG/2ML
125 INJECTION, POWDER, LYOPHILIZED, FOR SOLUTION INTRAMUSCULAR; INTRAVENOUS
Status: CANCELLED
Start: 2021-12-14

## 2021-12-13 RX ORDER — EPINEPHRINE 1 MG/ML
0.3 INJECTION, SOLUTION INTRAMUSCULAR; SUBCUTANEOUS EVERY 5 MIN PRN
Status: CANCELLED | OUTPATIENT
Start: 2021-12-14

## 2021-12-13 RX ORDER — MEPERIDINE HYDROCHLORIDE 25 MG/ML
25 INJECTION INTRAMUSCULAR; INTRAVENOUS; SUBCUTANEOUS EVERY 30 MIN PRN
Status: CANCELLED | OUTPATIENT
Start: 2021-12-14

## 2021-12-13 RX ORDER — DIPHENHYDRAMINE HYDROCHLORIDE 50 MG/ML
50 INJECTION INTRAMUSCULAR; INTRAVENOUS
Status: CANCELLED
Start: 2021-12-14

## 2021-12-13 RX ADMIN — IRON SUCROSE 300 MG: 20 INJECTION, SOLUTION INTRAVENOUS at 12:33

## 2021-12-13 ASSESSMENT — PAIN SCALES - GENERAL: PAINLEVEL: NO PAIN (0)

## 2021-12-13 NOTE — PROGRESS NOTES
Infusion Nursing Note:  Raven Matt presents today for venofer.    Patient seen by provider today: No   present during visit today: Not Applicable.    Note: Orders were for 3 doses; although the patient discussed with her doctor that due to being induced early the patient will only need two doses prior to delivery. Last appt cancelled.      Intravenous Access:  Peripheral IV placed.    Treatment Conditions:  Not Applicable.      Post Infusion Assessment:  Patient tolerated infusion without incident.  Site patent and intact, free from redness, edema or discomfort.  No evidence of extravasations.  Access discontinued per protocol.       Discharge Plan:   AVS to patient via MYCHART.  Patient will return prn for next appointment.   Patient discharged in stable condition accompanied by: self.  Departure Mode: Ambulatory.      Keith Knutson RN

## 2021-12-20 ENCOUNTER — LAB (OUTPATIENT)
Dept: LAB | Facility: CLINIC | Age: 31
End: 2021-12-20
Attending: OBSTETRICS & GYNECOLOGY

## 2021-12-20 LAB — SARS-COV-2 RNA RESP QL NAA+PROBE: NEGATIVE

## 2021-12-20 PROCEDURE — U0005 INFEC AGEN DETEC AMPLI PROBE: HCPCS

## 2021-12-20 PROCEDURE — U0003 INFECTIOUS AGENT DETECTION BY NUCLEIC ACID (DNA OR RNA); SEVERE ACUTE RESPIRATORY SYNDROME CORONAVIRUS 2 (SARS-COV-2) (CORONAVIRUS DISEASE [COVID-19]), AMPLIFIED PROBE TECHNIQUE, MAKING USE OF HIGH THROUGHPUT TECHNOLOGIES AS DESCRIBED BY CMS-2020-01-R: HCPCS

## 2021-12-22 ENCOUNTER — ANESTHESIA EVENT (OUTPATIENT)
Dept: OBGYN | Facility: CLINIC | Age: 31
End: 2021-12-22
Payer: COMMERCIAL

## 2021-12-22 ENCOUNTER — HOSPITAL ENCOUNTER (INPATIENT)
Facility: CLINIC | Age: 31
LOS: 2 days | Discharge: HOME | End: 2021-12-24
Attending: OBSTETRICS & GYNECOLOGY | Admitting: OBSTETRICS & GYNECOLOGY
Payer: COMMERCIAL

## 2021-12-22 ENCOUNTER — ANESTHESIA (OUTPATIENT)
Dept: OBGYN | Facility: CLINIC | Age: 31
End: 2021-12-22
Payer: COMMERCIAL

## 2021-12-22 LAB
ABO/RH(D): NORMAL
ANTIBODY SCREEN: NEGATIVE
APTT PPP: 29 SECONDS (ref 22–38)
ERYTHROCYTE [DISTWIDTH] IN BLOOD BY AUTOMATED COUNT: 22.7 % (ref 10–15)
FIBRINOGEN PPP-MCNC: 528 MG/DL (ref 170–490)
HCT VFR BLD AUTO: 36.3 % (ref 35–47)
HGB BLD-MCNC: 10.1 G/DL (ref 11.7–15.7)
INR PPP: 0.92 (ref 0.85–1.15)
MCH RBC QN AUTO: 23.2 PG (ref 26.5–33)
MCHC RBC AUTO-ENTMCNC: 27.8 G/DL (ref 31.5–36.5)
MCV RBC AUTO: 83 FL (ref 78–100)
PLATELET # BLD AUTO: 185 10E3/UL (ref 150–450)
RBC # BLD AUTO: 4.35 10E6/UL (ref 3.8–5.2)
SPECIMEN EXPIRATION DATE: NORMAL
WBC # BLD AUTO: 9.2 10E3/UL (ref 4–11)

## 2021-12-22 PROCEDURE — 85610 PROTHROMBIN TIME: CPT | Performed by: OBSTETRICS & GYNECOLOGY

## 2021-12-22 PROCEDURE — 250N000011 HC RX IP 250 OP 636: Performed by: ANESTHESIOLOGY

## 2021-12-22 PROCEDURE — 00HU33Z INSERTION OF INFUSION DEVICE INTO SPINAL CANAL, PERCUTANEOUS APPROACH: ICD-10-PCS | Performed by: ANESTHESIOLOGY

## 2021-12-22 PROCEDURE — 36415 COLL VENOUS BLD VENIPUNCTURE: CPT | Performed by: OBSTETRICS & GYNECOLOGY

## 2021-12-22 PROCEDURE — 250N000009 HC RX 250: Performed by: ANESTHESIOLOGY

## 2021-12-22 PROCEDURE — 120N000001 HC R&B MED SURG/OB

## 2021-12-22 PROCEDURE — 258N000003 HC RX IP 258 OP 636: Performed by: OBSTETRICS & GYNECOLOGY

## 2021-12-22 PROCEDURE — 3E0R3BZ INTRODUCTION OF ANESTHETIC AGENT INTO SPINAL CANAL, PERCUTANEOUS APPROACH: ICD-10-PCS | Performed by: ANESTHESIOLOGY

## 2021-12-22 PROCEDURE — 370N000003 HC ANESTHESIA WARD SERVICE

## 2021-12-22 PROCEDURE — 86901 BLOOD TYPING SEROLOGIC RH(D): CPT | Performed by: OBSTETRICS & GYNECOLOGY

## 2021-12-22 PROCEDURE — 250N000011 HC RX IP 250 OP 636: Performed by: OBSTETRICS & GYNECOLOGY

## 2021-12-22 PROCEDURE — 85384 FIBRINOGEN ACTIVITY: CPT | Performed by: OBSTETRICS & GYNECOLOGY

## 2021-12-22 PROCEDURE — 85730 THROMBOPLASTIN TIME PARTIAL: CPT | Performed by: OBSTETRICS & GYNECOLOGY

## 2021-12-22 PROCEDURE — 85027 COMPLETE CBC AUTOMATED: CPT | Performed by: OBSTETRICS & GYNECOLOGY

## 2021-12-22 RX ORDER — NALOXONE HYDROCHLORIDE 0.4 MG/ML
0.2 INJECTION, SOLUTION INTRAMUSCULAR; INTRAVENOUS; SUBCUTANEOUS
Status: DISCONTINUED | OUTPATIENT
Start: 2021-12-22 | End: 2021-12-23 | Stop reason: HOSPADM

## 2021-12-22 RX ORDER — CEFAZOLIN SODIUM 2 G/100ML
2 INJECTION, SOLUTION INTRAVENOUS ONCE
Status: COMPLETED | OUTPATIENT
Start: 2021-12-22 | End: 2021-12-22

## 2021-12-22 RX ORDER — OXYTOCIN 10 [USP'U]/ML
10 INJECTION, SOLUTION INTRAMUSCULAR; INTRAVENOUS
Status: DISCONTINUED | OUTPATIENT
Start: 2021-12-22 | End: 2021-12-23 | Stop reason: HOSPADM

## 2021-12-22 RX ORDER — FENTANYL CITRATE 50 UG/ML
50-100 INJECTION, SOLUTION INTRAMUSCULAR; INTRAVENOUS
Status: DISCONTINUED | OUTPATIENT
Start: 2021-12-22 | End: 2021-12-23 | Stop reason: HOSPADM

## 2021-12-22 RX ORDER — OXYTOCIN/0.9 % SODIUM CHLORIDE 30/500 ML
100-340 PLASTIC BAG, INJECTION (ML) INTRAVENOUS CONTINUOUS PRN
Status: DISCONTINUED | OUTPATIENT
Start: 2021-12-22 | End: 2021-12-24 | Stop reason: HOSPADM

## 2021-12-22 RX ORDER — METOCLOPRAMIDE HYDROCHLORIDE 5 MG/ML
10 INJECTION INTRAMUSCULAR; INTRAVENOUS EVERY 6 HOURS PRN
Status: DISCONTINUED | OUTPATIENT
Start: 2021-12-22 | End: 2021-12-23 | Stop reason: HOSPADM

## 2021-12-22 RX ORDER — EPHEDRINE SULFATE 50 MG/ML
5 INJECTION, SOLUTION INTRAMUSCULAR; INTRAVENOUS; SUBCUTANEOUS
Status: DISCONTINUED | OUTPATIENT
Start: 2021-12-22 | End: 2021-12-24 | Stop reason: HOSPADM

## 2021-12-22 RX ORDER — METOCLOPRAMIDE 10 MG/1
10 TABLET ORAL EVERY 6 HOURS PRN
Status: DISCONTINUED | OUTPATIENT
Start: 2021-12-22 | End: 2021-12-23 | Stop reason: HOSPADM

## 2021-12-22 RX ORDER — FENTANYL CITRATE 50 UG/ML
INJECTION, SOLUTION INTRAMUSCULAR; INTRAVENOUS
Status: COMPLETED | OUTPATIENT
Start: 2021-12-22 | End: 2021-12-22

## 2021-12-22 RX ORDER — SODIUM CHLORIDE, SODIUM LACTATE, POTASSIUM CHLORIDE, CALCIUM CHLORIDE 600; 310; 30; 20 MG/100ML; MG/100ML; MG/100ML; MG/100ML
INJECTION, SOLUTION INTRAVENOUS CONTINUOUS
Status: DISCONTINUED | OUTPATIENT
Start: 2021-12-23 | End: 2021-12-24 | Stop reason: HOSPADM

## 2021-12-22 RX ORDER — OXYTOCIN 10 [USP'U]/ML
10 INJECTION, SOLUTION INTRAMUSCULAR; INTRAVENOUS
Status: DISCONTINUED | OUTPATIENT
Start: 2021-12-22 | End: 2021-12-24 | Stop reason: HOSPADM

## 2021-12-22 RX ORDER — PROCHLORPERAZINE MALEATE 5 MG
10 TABLET ORAL EVERY 6 HOURS PRN
Status: DISCONTINUED | OUTPATIENT
Start: 2021-12-22 | End: 2021-12-23 | Stop reason: HOSPADM

## 2021-12-22 RX ORDER — NALOXONE HYDROCHLORIDE 0.4 MG/ML
0.4 INJECTION, SOLUTION INTRAMUSCULAR; INTRAVENOUS; SUBCUTANEOUS
Status: DISCONTINUED | OUTPATIENT
Start: 2021-12-22 | End: 2021-12-23 | Stop reason: HOSPADM

## 2021-12-22 RX ORDER — PROCHLORPERAZINE 25 MG
25 SUPPOSITORY, RECTAL RECTAL EVERY 12 HOURS PRN
Status: DISCONTINUED | OUTPATIENT
Start: 2021-12-22 | End: 2021-12-23 | Stop reason: HOSPADM

## 2021-12-22 RX ORDER — ROPIVACAINE HYDROCHLORIDE 2 MG/ML
10 INJECTION, SOLUTION EPIDURAL; INFILTRATION; PERINEURAL ONCE
Status: DISCONTINUED | OUTPATIENT
Start: 2021-12-22 | End: 2021-12-24 | Stop reason: HOSPADM

## 2021-12-22 RX ORDER — TERBUTALINE SULFATE 1 MG/ML
0.25 INJECTION, SOLUTION SUBCUTANEOUS
Status: DISCONTINUED | OUTPATIENT
Start: 2021-12-22 | End: 2021-12-23 | Stop reason: HOSPADM

## 2021-12-22 RX ORDER — ONDANSETRON 4 MG/1
4 TABLET, ORALLY DISINTEGRATING ORAL EVERY 6 HOURS PRN
Status: DISCONTINUED | OUTPATIENT
Start: 2021-12-22 | End: 2021-12-23 | Stop reason: HOSPADM

## 2021-12-22 RX ORDER — ROPIVACAINE HYDROCHLORIDE 2 MG/ML
INJECTION, SOLUTION EPIDURAL; INFILTRATION; PERINEURAL
Status: COMPLETED | OUTPATIENT
Start: 2021-12-22 | End: 2021-12-22

## 2021-12-22 RX ORDER — TRANEXAMIC ACID 10 MG/ML
1 INJECTION, SOLUTION INTRAVENOUS EVERY 30 MIN PRN
Status: DISCONTINUED | OUTPATIENT
Start: 2021-12-22 | End: 2021-12-23 | Stop reason: HOSPADM

## 2021-12-22 RX ORDER — KETOROLAC TROMETHAMINE 30 MG/ML
30 INJECTION, SOLUTION INTRAMUSCULAR; INTRAVENOUS
Status: DISCONTINUED | OUTPATIENT
Start: 2021-12-22 | End: 2021-12-23

## 2021-12-22 RX ORDER — METHYLERGONOVINE MALEATE 0.2 MG/ML
200 INJECTION INTRAVENOUS
Status: DISCONTINUED | OUTPATIENT
Start: 2021-12-22 | End: 2021-12-23 | Stop reason: HOSPADM

## 2021-12-22 RX ORDER — NALBUPHINE HYDROCHLORIDE 10 MG/ML
2.5-5 INJECTION, SOLUTION INTRAMUSCULAR; INTRAVENOUS; SUBCUTANEOUS EVERY 6 HOURS PRN
Status: DISCONTINUED | OUTPATIENT
Start: 2021-12-22 | End: 2021-12-24 | Stop reason: HOSPADM

## 2021-12-22 RX ORDER — MISOPROSTOL 200 UG/1
400 TABLET ORAL
Status: DISCONTINUED | OUTPATIENT
Start: 2021-12-22 | End: 2021-12-23 | Stop reason: HOSPADM

## 2021-12-22 RX ORDER — HYDROXYZINE HYDROCHLORIDE 50 MG/1
50 TABLET, FILM COATED ORAL
Status: DISCONTINUED | OUTPATIENT
Start: 2021-12-22 | End: 2021-12-23 | Stop reason: HOSPADM

## 2021-12-22 RX ORDER — OXYTOCIN/0.9 % SODIUM CHLORIDE 30/500 ML
340 PLASTIC BAG, INJECTION (ML) INTRAVENOUS CONTINUOUS PRN
Status: DISCONTINUED | OUTPATIENT
Start: 2021-12-22 | End: 2021-12-23 | Stop reason: HOSPADM

## 2021-12-22 RX ORDER — FENTANYL CITRATE 50 UG/ML
100 INJECTION, SOLUTION INTRAMUSCULAR; INTRAVENOUS ONCE
Status: DISCONTINUED | OUTPATIENT
Start: 2021-12-22 | End: 2021-12-24 | Stop reason: HOSPADM

## 2021-12-22 RX ORDER — CEFAZOLIN SODIUM 1 G/3ML
1 INJECTION, POWDER, FOR SOLUTION INTRAMUSCULAR; INTRAVENOUS EVERY 8 HOURS
Status: DISCONTINUED | OUTPATIENT
Start: 2021-12-23 | End: 2021-12-23

## 2021-12-22 RX ORDER — MISOPROSTOL 200 UG/1
800 TABLET ORAL
Status: DISCONTINUED | OUTPATIENT
Start: 2021-12-22 | End: 2021-12-23 | Stop reason: HOSPADM

## 2021-12-22 RX ORDER — ONDANSETRON 2 MG/ML
4 INJECTION INTRAMUSCULAR; INTRAVENOUS EVERY 6 HOURS PRN
Status: DISCONTINUED | OUTPATIENT
Start: 2021-12-22 | End: 2021-12-23 | Stop reason: HOSPADM

## 2021-12-22 RX ORDER — IBUPROFEN 600 MG/1
600 TABLET, FILM COATED ORAL
Status: DISCONTINUED | OUTPATIENT
Start: 2021-12-22 | End: 2021-12-23

## 2021-12-22 RX ORDER — CARBOPROST TROMETHAMINE 250 UG/ML
250 INJECTION, SOLUTION INTRAMUSCULAR
Status: DISCONTINUED | OUTPATIENT
Start: 2021-12-22 | End: 2021-12-23 | Stop reason: HOSPADM

## 2021-12-22 RX ADMIN — Medication 5 MG: at 23:20

## 2021-12-22 RX ADMIN — Medication: at 22:13

## 2021-12-22 RX ADMIN — Medication 5 MG: at 23:13

## 2021-12-22 RX ADMIN — Medication 5 MG: at 23:26

## 2021-12-22 RX ADMIN — SODIUM CHLORIDE, POTASSIUM CHLORIDE, SODIUM LACTATE AND CALCIUM CHLORIDE 1000 ML: 600; 310; 30; 20 INJECTION, SOLUTION INTRAVENOUS at 21:40

## 2021-12-22 RX ADMIN — ROPIVACAINE HYDROCHLORIDE 10 ML: 2 INJECTION, SOLUTION EPIDURAL; INFILTRATION at 22:18

## 2021-12-22 RX ADMIN — CEFAZOLIN SODIUM 2 G: 2 INJECTION, SOLUTION INTRAVENOUS at 23:18

## 2021-12-22 RX ADMIN — FENTANYL CITRATE 100 MCG: 50 INJECTION, SOLUTION INTRAMUSCULAR; INTRAVENOUS at 22:18

## 2021-12-22 ASSESSMENT — ACTIVITIES OF DAILY LIVING (ADL)
DIFFICULTY_EATING/SWALLOWING: NO
DIFFICULTY_COMMUNICATING: NO
DOING_ERRANDS_INDEPENDENTLY_DIFFICULTY: NO
DRESSING/BATHING_DIFFICULTY: NO
CONCENTRATING,_REMEMBERING_OR_MAKING_DECISIONS_DIFFICULTY: NO
FALL_HISTORY_WITHIN_LAST_SIX_MONTHS: NO
WALKING_OR_CLIMBING_STAIRS_DIFFICULTY: NO
TOILETING_ISSUES: NO

## 2021-12-22 ASSESSMENT — MIFFLIN-ST. JEOR: SCORE: 1391.28

## 2021-12-22 NOTE — PLAN OF CARE
39+3 weeks pregnant pt of Dr. Singh here for cervical ripening. POC discussed with pt & ; all questions answered. Pt &  verbalized understanding and agreed to POC. [place] & US monitors applied. Admission assessment completed in Epic. No orders as of yet. VS taken. Awaiting Dr. Singh to eval pt in person, give orders.

## 2021-12-23 LAB
CREAT SERPL-MCNC: 0.63 MG/DL (ref 0.52–1.04)
GFR SERPL CREATININE-BSD FRML MDRD: >90 ML/MIN/1.73M2
PLATELET # BLD AUTO: 162 10E3/UL (ref 150–450)

## 2021-12-23 PROCEDURE — 722N000001 HC LABOR CARE VAGINAL DELIVERY SINGLE

## 2021-12-23 PROCEDURE — 250N000009 HC RX 250: Performed by: OBSTETRICS & GYNECOLOGY

## 2021-12-23 PROCEDURE — 258N000003 HC RX IP 258 OP 636: Performed by: OBSTETRICS & GYNECOLOGY

## 2021-12-23 PROCEDURE — 120N000012 HC R&B POSTPARTUM

## 2021-12-23 PROCEDURE — 0KQM0ZZ REPAIR PERINEUM MUSCLE, OPEN APPROACH: ICD-10-PCS | Performed by: OBSTETRICS & GYNECOLOGY

## 2021-12-23 PROCEDURE — 82565 ASSAY OF CREATININE: CPT | Performed by: OBSTETRICS & GYNECOLOGY

## 2021-12-23 PROCEDURE — 250N000011 HC RX IP 250 OP 636: Performed by: ANESTHESIOLOGY

## 2021-12-23 PROCEDURE — 250N000013 HC RX MED GY IP 250 OP 250 PS 637: Performed by: OBSTETRICS & GYNECOLOGY

## 2021-12-23 PROCEDURE — 36415 COLL VENOUS BLD VENIPUNCTURE: CPT | Performed by: OBSTETRICS & GYNECOLOGY

## 2021-12-23 PROCEDURE — 85049 AUTOMATED PLATELET COUNT: CPT | Performed by: OBSTETRICS & GYNECOLOGY

## 2021-12-23 RX ORDER — IBUPROFEN 400 MG/1
800 TABLET, FILM COATED ORAL EVERY 6 HOURS PRN
Status: DISCONTINUED | OUTPATIENT
Start: 2021-12-23 | End: 2021-12-24 | Stop reason: HOSPADM

## 2021-12-23 RX ORDER — CEFAZOLIN SODIUM 1 G/3ML
1 INJECTION, POWDER, FOR SOLUTION INTRAMUSCULAR; INTRAVENOUS EVERY 8 HOURS
Status: DISCONTINUED | OUTPATIENT
Start: 2021-12-23 | End: 2021-12-23 | Stop reason: HOSPADM

## 2021-12-23 RX ORDER — CARBOPROST TROMETHAMINE 250 UG/ML
250 INJECTION, SOLUTION INTRAMUSCULAR
Status: DISCONTINUED | OUTPATIENT
Start: 2021-12-23 | End: 2021-12-24 | Stop reason: HOSPADM

## 2021-12-23 RX ORDER — HYDROCORTISONE 2.5 %
CREAM (GRAM) TOPICAL 3 TIMES DAILY PRN
Status: DISCONTINUED | OUTPATIENT
Start: 2021-12-23 | End: 2021-12-24 | Stop reason: HOSPADM

## 2021-12-23 RX ORDER — LEVOTHYROXINE SODIUM 50 UG/1
50 TABLET ORAL
Status: DISCONTINUED | OUTPATIENT
Start: 2021-12-23 | End: 2021-12-24 | Stop reason: HOSPADM

## 2021-12-23 RX ORDER — TRANEXAMIC ACID 10 MG/ML
1 INJECTION, SOLUTION INTRAVENOUS EVERY 30 MIN PRN
Status: DISCONTINUED | OUTPATIENT
Start: 2021-12-23 | End: 2021-12-24 | Stop reason: HOSPADM

## 2021-12-23 RX ORDER — LIDOCAINE 40 MG/G
CREAM TOPICAL
Status: DISCONTINUED | OUTPATIENT
Start: 2021-12-23 | End: 2021-12-23 | Stop reason: HOSPADM

## 2021-12-23 RX ORDER — ACETAMINOPHEN 325 MG/1
650 TABLET ORAL EVERY 4 HOURS PRN
Status: DISCONTINUED | OUTPATIENT
Start: 2021-12-23 | End: 2021-12-24 | Stop reason: HOSPADM

## 2021-12-23 RX ORDER — MISOPROSTOL 200 UG/1
800 TABLET ORAL
Status: DISCONTINUED | OUTPATIENT
Start: 2021-12-23 | End: 2021-12-24 | Stop reason: HOSPADM

## 2021-12-23 RX ORDER — MISOPROSTOL 200 UG/1
400 TABLET ORAL
Status: DISCONTINUED | OUTPATIENT
Start: 2021-12-23 | End: 2021-12-24 | Stop reason: HOSPADM

## 2021-12-23 RX ORDER — OXYTOCIN/0.9 % SODIUM CHLORIDE 30/500 ML
340 PLASTIC BAG, INJECTION (ML) INTRAVENOUS CONTINUOUS PRN
Status: DISCONTINUED | OUTPATIENT
Start: 2021-12-23 | End: 2021-12-24 | Stop reason: HOSPADM

## 2021-12-23 RX ORDER — DOCUSATE SODIUM 100 MG/1
100 CAPSULE, LIQUID FILLED ORAL DAILY
Status: DISCONTINUED | OUTPATIENT
Start: 2021-12-23 | End: 2021-12-24 | Stop reason: HOSPADM

## 2021-12-23 RX ORDER — LEVOTHYROXINE SODIUM 50 UG/1
50 TABLET ORAL DAILY
Status: DISCONTINUED | OUTPATIENT
Start: 2021-12-23 | End: 2021-12-24 | Stop reason: HOSPADM

## 2021-12-23 RX ORDER — OXYTOCIN/0.9 % SODIUM CHLORIDE 30/500 ML
1-24 PLASTIC BAG, INJECTION (ML) INTRAVENOUS CONTINUOUS
Status: DISCONTINUED | OUTPATIENT
Start: 2021-12-23 | End: 2021-12-23 | Stop reason: HOSPADM

## 2021-12-23 RX ORDER — METHYLERGONOVINE MALEATE 0.2 MG/ML
200 INJECTION INTRAVENOUS
Status: DISCONTINUED | OUTPATIENT
Start: 2021-12-23 | End: 2021-12-24 | Stop reason: HOSPADM

## 2021-12-23 RX ORDER — OXYTOCIN 10 [USP'U]/ML
10 INJECTION, SOLUTION INTRAMUSCULAR; INTRAVENOUS
Status: DISCONTINUED | OUTPATIENT
Start: 2021-12-23 | End: 2021-12-24 | Stop reason: HOSPADM

## 2021-12-23 RX ORDER — BISACODYL 10 MG
10 SUPPOSITORY, RECTAL RECTAL DAILY PRN
Status: DISCONTINUED | OUTPATIENT
Start: 2021-12-23 | End: 2021-12-24 | Stop reason: HOSPADM

## 2021-12-23 RX ORDER — MODIFIED LANOLIN
OINTMENT (GRAM) TOPICAL
Status: DISCONTINUED | OUTPATIENT
Start: 2021-12-23 | End: 2021-12-24 | Stop reason: HOSPADM

## 2021-12-23 RX ORDER — SODIUM CHLORIDE, SODIUM LACTATE, POTASSIUM CHLORIDE, CALCIUM CHLORIDE 600; 310; 30; 20 MG/100ML; MG/100ML; MG/100ML; MG/100ML
INJECTION, SOLUTION INTRAVENOUS CONTINUOUS PRN
Status: DISCONTINUED | OUTPATIENT
Start: 2021-12-23 | End: 2021-12-23 | Stop reason: HOSPADM

## 2021-12-23 RX ADMIN — LEVOTHYROXINE SODIUM 50 MCG: 50 TABLET ORAL at 09:13

## 2021-12-23 RX ADMIN — IBUPROFEN 800 MG: 400 TABLET, FILM COATED ORAL at 09:14

## 2021-12-23 RX ADMIN — Medication 2 MILLI-UNITS/MIN: at 01:33

## 2021-12-23 RX ADMIN — IBUPROFEN 800 MG: 400 TABLET, FILM COATED ORAL at 16:03

## 2021-12-23 RX ADMIN — DOCUSATE SODIUM 100 MG: 100 CAPSULE, LIQUID FILLED ORAL at 09:14

## 2021-12-23 RX ADMIN — Medication 340 ML/HR: at 07:55

## 2021-12-23 RX ADMIN — IBUPROFEN 800 MG: 400 TABLET, FILM COATED ORAL at 22:02

## 2021-12-23 RX ADMIN — Medication: at 06:03

## 2021-12-23 RX ADMIN — ACETAMINOPHEN 650 MG: 325 TABLET, FILM COATED ORAL at 22:02

## 2021-12-23 RX ADMIN — SODIUM CHLORIDE, POTASSIUM CHLORIDE, SODIUM LACTATE AND CALCIUM CHLORIDE: 600; 310; 30; 20 INJECTION, SOLUTION INTRAVENOUS at 05:32

## 2021-12-23 RX ADMIN — ACETAMINOPHEN 650 MG: 325 TABLET, FILM COATED ORAL at 09:14

## 2021-12-23 RX ADMIN — SODIUM CHLORIDE, POTASSIUM CHLORIDE, SODIUM LACTATE AND CALCIUM CHLORIDE: 600; 310; 30; 20 INJECTION, SOLUTION INTRAVENOUS at 00:00

## 2021-12-23 RX ADMIN — ACETAMINOPHEN 650 MG: 325 TABLET, FILM COATED ORAL at 16:03

## 2021-12-23 ASSESSMENT — ACTIVITIES OF DAILY LIVING (ADL)
ADLS_ACUITY_SCORE: 3

## 2021-12-23 NOTE — PROGRESS NOTES
"Labor Progress Note:    S: Pt is comfortable with epidural. Feeling a bit of pressure.     O:  /53   Pulse (!) 16   Temp 98  F (36.7  C) (Oral)   Ht 1.651 m (5' 5\")   Wt 68 kg (150 lb)   LMP 2020   BMI 24.96 kg/m    SVE: 9.5/90/-1  TOCO: Q 2 minutes  FHR: 135 baseline cat 1.     A/P: 32 yo  at 39+4/7 wks. Induction of labor.   1. Anticipate . Continue to labor until complete and feeling pressure.   2. Epidural for pain management.     Perla Wynne MD    "

## 2021-12-23 NOTE — H&P
No significant change in general health status based on examination of the patient, review of Nursing Admission Database and prenatal record. 30yo  at 39w3d for induction of labor due to medical history c/b VTE on Lovenox during the pregnancy. Last dose of Lovenox this morning. GBS positive, with penicillin allergy (hives).     EFW: 7lb 8oz   Gen--NAD  CV--RRR  FHR--baseline 140, category 1  [place]--every 2-3 minutes, mild  SVE /-3, posterior. Cook balloon placed. 70cc in cervical balloon, 30cc in vagina    Coags and type and screen sent. Will start oxytocin as needed up to 4 mu/min while balloon in place. Plan for .     Gudelia Singh MD

## 2021-12-23 NOTE — PLAN OF CARE
Problem: Adult Inpatient Plan of Care  Goal: Plan of Care Review  Outcome: No Change  Goal: Patient-Specific Goal (Individualized)  Outcome: No Change  Goal: Absence of Hospital-Acquired Illness or Injury  Outcome: No Change  Goal: Optimal Comfort and Wellbeing  Outcome: No Change  Intervention: Provide Person-Centered Care  Recent Flowsheet Documentation  Taken 12/22/2021 1930 by Tatum Gonzales RN  Trust Relationship/Rapport:   care explained   choices provided   emotional support provided   empathic listening provided   questions answered   questions encouraged   reassurance provided   thoughts/feelings acknowledged  Goal: Readiness for Transition of Care  Outcome: No Change     Problem: Bleeding (Labor)  Goal: Hemostasis  Outcome: No Change     Problem: Change in Fetal Wellbeing (Labor)  Goal: Stable Fetal Wellbeing  Outcome: No Change     Problem: Delayed Labor Progression (Labor)  Goal: Effective Progression to Delivery  Outcome: No Change     Problem: Infection (Labor)  Goal: Absence of Infection Signs and Symptoms  Outcome: No Change     Problem: Labor Pain (Labor)  Goal: Acceptable Pain Control  Outcome: No Change     Problem: Uterine Tachysystole (Labor)  Goal: Normal Uterine Contraction Pattern  Outcome: No Change

## 2021-12-23 NOTE — PLAN OF CARE
Data: Raven Matt transferred to 415 via wheelchair at 1030. Baby transferred via parent's arms.  Action: Receiving unit notified of transfer: Yes. Patient and family notified of room change. Report given to Juanita Chan RN at 1030. Belongings sent to receiving unit. Accompanied by Registered Nurse. Oriented patient to surroundings. Call light within reach. ID bands double-checked with receiving RN.  Response: Patient tolerated transfer and is stable.

## 2021-12-23 NOTE — BRIEF OP NOTE
Delivery Summary:    1. IUP at 39+4/7 wks.   2. Induction with COOK catheter and pitocin. Spontaneous rupture.   3. Epidural for pain management. Mom on Lovenox and GBS negative.   4.  of female in DANNY presentation. No nuchal cord. Category 1 tracing.   5. Placenta intact with 3-v cord.   6. Perineum with 2nd degree lac repaired with 3-0 vicryl.   7. EBL of 200 ml.     Perla Wynne MD

## 2021-12-23 NOTE — ANESTHESIA PROCEDURE NOTES
Epidural catheter Procedure Note    Pre-Procedure   Staff -        Anesthesiologist:  Juliana Quezada MD       Performed By: anesthesiologist       Location: OB       Pre-Anesthestic Checklist: patient identified, IV checked, risks and benefits discussed, informed consent, monitors and equipment checked, pre-op evaluation and at physician/surgeon's request  Timeout:       Correct Patient: Yes        Correct Procedure: Yes        Correct Site: Yes        Correct Position: Yes   Procedure Documentation  Procedure: epidural catheter       Patient Position: sitting       Patient Prep/Sterile Barriers: sterile gloves, mask, patient draped       Skin prep: Betadine       Local skin infiltrated with 3 mL of 1% lidocaine.        Insertion Site: L3-4. (midline approach).       Technique: LORT saline        MARAL at 6 cm.       Needle Type: [employer] needle       Needle Gauge: 17.        Needle Length (Inches): 3.5        Catheter: 19 G.         Catheter threaded easily.         5 cm epidural space.         Threaded 11 cm at skin.        # of attempts: 1 and  # of redirects: : 0.    Assessment/Narrative         Paresthesias: No.      Test dose of 3 mL lidocaine 1.5% w/ 1:200,000 epinephrine at.         Test dose negative, 3 minutes after injection, for signs of intravascular, subdural, or intrathecal injection.       Insertion/Infusion Method: LORT saline       Aspiration negative for Heme or CSF via Epidural Catheter.    Medication(s) Administered   0.2% Ropivacaine (Epidural), 10 mL  Fentanyl PF (Epidural), 100 mcg  Medication Administration Time: 12/22/2021 10:18 PM     Comments:  Pt tolerated well.   Immediately to supine with DOMINIC.   FHTs stable post-procedure.   No complications.

## 2021-12-23 NOTE — ANESTHESIA PREPROCEDURE EVALUATION
Anesthesia Pre-Procedure Evaluation    Patient: Raven Matt   MRN: [id number] : 1990        Preoperative Diagnosis: * No surgery found *    Procedure :           Past Medical History:   Diagnosis Date     Anemia, iron deficiency      Cerebral infarction (H)      Dural sinus thrombosis      Hypothyroidism       Past Surgical History:   Procedure Laterality Date     Egg retrieval       HYSTEROSCOPY       WISDOM TOOTH EXTRACTION        Allergies   Allergen Reactions     Penicillins Hives     Sulfa Drugs Hives      Social History     Tobacco Use     Smoking status: Never Smoker     Smokeless tobacco: Never Used   Substance Use Topics     Alcohol use: Yes      Wt Readings from Last 1 Encounters:   21 68 kg (150 lb)        Anesthesia Evaluation            ROS/MED HX  ENT/Pulmonary:    (-) asthma   Neurologic:  - neg neurologic ROS     Cardiovascular:    (-) PIH   METS/Exercise Tolerance:     Hematologic: Comments: H/o DVT on Lovenox 40 mg every day.  Took at 8am .      (+) anticoagulation therapy, no coagulopathy,     Musculoskeletal:       GI/Hepatic:     (+) GERD,     Renal/Genitourinary:       Endo:       Psychiatric/Substance Use:       Infectious Disease:       Malignancy:       Other:            Physical Exam    Airway        Mallampati: II   TM distance: > 3 FB   Neck ROM: full     Respiratory Devices and Support         Dental  no notable dental history         Cardiovascular   cardiovascular exam normal          Pulmonary   pulmonary exam normal                OUTSIDE LABS:  CBC:   Lab Results   Component Value Date    WBC 9.2 2021    HGB 10.1 (L) 2021    HCT 36.3 2021     2021     BMP: No results found for: NA, POTASSIUM, CHLORIDE, CO2, BUN, CR, GLC  COAGS:   Lab Results   Component Value Date    PTT 29 2021    INR 0.92 2021    FIBR 528 (H) 2021     POC: No results found for: BGM, HCG, HCGS  HEPATIC: No results found for: ALBUMIN, PROTTOTAL, ALT,  AST, GGT, ALKPHOS, BILITOTAL, BILIDIRECT, CADEN  OTHER: No results found for: PH, LACT, A1C, PRIETO, PHOS, MAG, LIPASE, AMYLASE, TSH, T4, T3, CRP, SED    Anesthesia Plan    ASA Status:  2      Anesthesia Type: Epidural.              Consents    Anesthesia Plan(s) and associated risks, benefits, and realistic alternatives discussed. Questions answered and patient/representative(s) expressed understanding.    - Discussed:     - Discussed with:  Patient         Postoperative Care            Comments:    Other Comments: Orders to manage the epidural infusion have been entered, and through coordination with the nurse, we will continute to manage and monitor the patient's labor epidural.  We will continuously be available to adjust as needed thruout the entire L&D process.             Juliana Quezada MD, MD

## 2021-12-24 VITALS
TEMPERATURE: 98.4 F | SYSTOLIC BLOOD PRESSURE: 96 MMHG | WEIGHT: 150 LBS | RESPIRATION RATE: 16 BRPM | OXYGEN SATURATION: 100 % | HEART RATE: 60 BPM | DIASTOLIC BLOOD PRESSURE: 59 MMHG | BODY MASS INDEX: 24.99 KG/M2 | HEIGHT: 65 IN

## 2021-12-24 LAB — HGB BLD-MCNC: 9.1 G/DL (ref 11.7–15.7)

## 2021-12-24 PROCEDURE — 250N000013 HC RX MED GY IP 250 OP 250 PS 637: Performed by: OBSTETRICS & GYNECOLOGY

## 2021-12-24 PROCEDURE — 36415 COLL VENOUS BLD VENIPUNCTURE: CPT | Performed by: OBSTETRICS & GYNECOLOGY

## 2021-12-24 PROCEDURE — 250N000011 HC RX IP 250 OP 636: Performed by: OBSTETRICS & GYNECOLOGY

## 2021-12-24 PROCEDURE — 85018 HEMOGLOBIN: CPT | Performed by: OBSTETRICS & GYNECOLOGY

## 2021-12-24 RX ADMIN — DOCUSATE SODIUM 100 MG: 100 CAPSULE, LIQUID FILLED ORAL at 08:43

## 2021-12-24 RX ADMIN — IBUPROFEN 800 MG: 400 TABLET, FILM COATED ORAL at 03:53

## 2021-12-24 RX ADMIN — ENOXAPARIN SODIUM 40 MG: 40 INJECTION SUBCUTANEOUS at 05:40

## 2021-12-24 RX ADMIN — IBUPROFEN 800 MG: 400 TABLET, FILM COATED ORAL at 10:57

## 2021-12-24 RX ADMIN — ACETAMINOPHEN 650 MG: 325 TABLET, FILM COATED ORAL at 12:59

## 2021-12-24 RX ADMIN — ACETAMINOPHEN 650 MG: 325 TABLET, FILM COATED ORAL at 08:44

## 2021-12-24 RX ADMIN — LEVOTHYROXINE SODIUM 50 MCG: 50 TABLET ORAL at 05:40

## 2021-12-24 RX ADMIN — ACETAMINOPHEN 650 MG: 325 TABLET, FILM COATED ORAL at 03:53

## 2021-12-24 ASSESSMENT — ACTIVITIES OF DAILY LIVING (ADL)
ADLS_ACUITY_SCORE: 3

## 2021-12-24 NOTE — LACTATION NOTE
"Lactation visit with Raven, FOJOHANNA, and baby female. They were working on a feeding at time of visit. Raven very good at holding, positioning infant . Offered a few tips on how to sandwich the breast and give infant good shoulder blade support and really bring her in quick to latch. Reviewed breastfeeding positions and techniques to obtain/maintain deep latch (including nose to nipple alignment and supporting infant's shoulder blades vs head when bringing infant in to latch). Discussed BF should feel like a strong \"tug or pull\" when infant is suckling and if mother experiences a \"pinching or biting\" sensation, how to un-latch infant properly, assess nipple shape and make any necessary adjustments with positioning before re-latching. Raven demonstrates competency with unlatching infant and adjusting latch for comfort.    Highlighted  breastfeeding basics:   1) Watch for early feeding cues (licking lips, stirring or rooting, sucking movement with mouth, hands to mouth) and always breast feed on DEMAND.  2) Infant should breastfeed a minimum of 8 times in 24 hours. If it has been 3 hours since last breast feeding session, un-swaddle infant and begin skin to skin to entice infant to nurse.  3) Techniques to waking a sleepy baby to nurse: (undress infant, change diaper if necessary, gently stroking bottom of feet and back, snuggling infant skin to skin, expressing colostrum).   Suggested Dr. Victoria Foreman's web site ([employer])  for additional education and videos on breastfeeding and the benefits of early hand expression.    Reviewed breast feeding section in our \"Guide to Postpartum and [place] Care.\" Encouraged parents to read about  feeding patterns/behavior: paying special attention to understanding infant's cluster-feeding (when and why's). Educated on nutritive vs non-nutritive suckling patterns. Showed how to record infant feedings along with voids and stools in the provided feeding log. " "    Discussed physiology of milk production from colostrum through milk coming in and how the breasts should begin to feel \"heavy or full\" between day 3-5. Answered questions regarding \"how to know when infant is done at the breast\".  Discussed normal infant weight loss and when infant should be back to birth weight.     Appreciative of visit.    Polina Yoon RN, IBCLC            "

## 2021-12-24 NOTE — PLAN OF CARE
Patient transferred from labor and delivery at 1035 Report taken from Halie MCGOVERN. Safety and security, and education reviewed. Baby bands checked. VSS. Fundus firm and midline. Scant flow. Pain 0/10, pain controled with tylenol and ibuprofen per MAR. Breastfeeding. Ambulated with assist left leg slight numb Voiding without difficulty. Encouraged to call with needs, questions, or concerns. Will continue to monitor.

## 2021-12-24 NOTE — PLAN OF CARE
D: VSS, assessments WDL.   I: Pt. received complete discharge paperwork.  Pt. was given times of last dose for all discharge medications in writing on discharge medication sheets.  Discharge teaching included home medication, pain management, activity restrictions, postpartum cares, and signs and symptoms of infection.    A: Discharge outcomes on care plan met.  Mother states understanding and comfort with self and baby cares.  P: Pt. discharged to home.  Pt. was discharged with baby, and bands were checked at time of discharge.  Pt. was accompanied by , nurse and baby, and left with personal belongings.   Pt. to follow up with OB per MD order.  Pt. had no further questions at the time of discharge and no unmet needs were identified.

## 2021-12-24 NOTE — PLAN OF CARE
Vital signs stable. Postpartum assessment WDL. Pain controlled with tylenol and ibuprofen. Using lanolin to nipples and warm packs to abdomen for uterine cramping. Patient up ad monica and voiding without difficulty. Breastfeeding on cue with minimal staff assistance. Patient and infant bonding well. Will continue with current plan of care.

## 2021-12-24 NOTE — PROGRESS NOTES
"OB Postpartum Note    S:  Patient without complaints.  Minimal lochia. Breastfeeding well. Pain controlled.     O:  Vitals were reviewed  Blood pressure 96/59, pulse 60, temperature 98.4  F (36.9  C), temperature source Oral, resp. rate 16, height 1.651 m (5' 5\"), weight 68 kg (150 lb), last menstrual period 11/17/2020, SpO2 100 %, unknown if currently breastfeeding.          Abdomen - Fundus firm, at umbilicus, nontender        Extremities - No calf tenderness, no unilateral edema    A:   Postpartum Day #1 , s/p Vaginal delivery - pregnancy c/b prophylactic Lovenox for h/o VTE, stable.     P:  1)  Discharge home today if pediatrician clears baby.         2)  F/U 2 and 8 weeks with [facility].         3) Continue Lovenox 40mg subcutaneous daily for 6 weeks PP.       Gudelia Singh MD      "

## 2021-12-24 NOTE — PLAN OF CARE
Vital signs stable. Postpartum assessment WDL. Pain controlled with tylenol and motrin  Patient up ambulating voiding without difficulty. Working on Breastfeeding  Patient and infant bonding well. Will continue with current plan of care.

## 2021-12-24 NOTE — ANESTHESIA POSTPROCEDURE EVALUATION
Patient: Raven Matt    Procedure: * No procedures listed *       Diagnosis:* No pre-op diagnosis entered *  Diagnosis Additional Information: No value filed.    Anesthesia Type:  Epidural    Note:  Disposition: Inpatient   Postop Pain Control: Uneventful            Sign Out: Well controlled pain   PONV: No   Neuro/Psych: Uneventful            Sign Out: Acceptable/Baseline neuro status   Airway/Respiratory: Uneventful            Sign Out: Acceptable/Baseline resp. status   CV/Hemodynamics: Uneventful            Sign Out: Acceptable CV status   Other NRE: NONE   DID A NON-ROUTINE EVENT OCCUR? No    Event details/Postop Comments:  Recovery for SAB/RA not complete but expected to resolve within 48 hours.           Last vitals:  Vitals Value Taken Time   BP     Temp     Pulse     Resp     SpO2         Electronically Signed By: Gavino Martinez MD  December 23, 2021  6:48 PM

## 2024-02-16 ENCOUNTER — MEDICAL CORRESPONDENCE (OUTPATIENT)
Dept: HEALTH INFORMATION MANAGEMENT | Facility: CLINIC | Age: 34
End: 2024-02-16

## 2024-02-19 LAB — RUBELLA ANTIBODY IGG (EXTERNAL): NORMAL

## 2024-06-24 NOTE — L&D DELIVERY NOTE
Delivery Date: 2021    HISTORY OF PRESENT ILLNESS AND DELIVERY COURSE:  The patient is a 31-year-old primiparous female who has been known to have a venous thromboembolus.  Because of that, she has been on Lovenox throughout her pregnancy.  She was brought in for induction of labor with Cook catheter placement and Pitocin augmentation.  The Cook catheter came out at 5-6 cm and she had spontaneous rupture of membranes.  She received an epidural for pain management and made it to complete cervical dilation.  She began to feel pressure and was +2 to +3 station and began actively pushing.  Over 20 minutes of contractions and category 1 tracing, she delivered a female infant over a second-degree perineal laceration that was repaired with 3-0 Vicryl in the usual fashion.  Placenta delivered spontaneously, Schultze presentation, intact with a 3-vessel cord and the EBL was 200 mL.  The patient will be transferred to PACU in stable condition.    Perla Wynne MD        D: 2021   T: 2021   MT: [id number]    Name:     MICHAEL HUFFMAN  MRN:      [id number]        Account:       [id number]   :      1990           Delivery Date: 2021     Document: [id number]   CO- pt endorsed to Dr. Alberto- f/u remaining ct, trop, reassess, dispo SARATH: Troponin noted to be elevated - EKG had not yet been performed. Obtained EKG which showed (+) RYAN in II, III, aVF with depressions in I and aVL, code STEMI called, cardiology at bedside. Will f/u recs. SARATH: Per cardio, no cath at this time, but recommending ASA, heparin, admit to CCU under Dr. Mejia. Neurosurgery consulted for T12 deformity and will follow. Trauma made aware and will follow as well.

## 2024-08-28 ENCOUNTER — HOSPITAL ENCOUNTER (OUTPATIENT)
Facility: CLINIC | Age: 34
End: 2024-08-28
Admitting: OBSTETRICS & GYNECOLOGY
Payer: COMMERCIAL

## 2024-08-28 ENCOUNTER — HOSPITAL ENCOUNTER (OUTPATIENT)
Facility: CLINIC | Age: 34
Discharge: HOME | End: 2024-08-28
Attending: OBSTETRICS & GYNECOLOGY | Admitting: OBSTETRICS & GYNECOLOGY
Payer: COMMERCIAL

## 2024-08-28 VITALS
RESPIRATION RATE: 16 BRPM | SYSTOLIC BLOOD PRESSURE: 104 MMHG | HEIGHT: 64 IN | OXYGEN SATURATION: 97 % | DIASTOLIC BLOOD PRESSURE: 60 MMHG | BODY MASS INDEX: 25.44 KG/M2 | TEMPERATURE: 99.6 F | WEIGHT: 149 LBS

## 2024-08-28 LAB
ALBUMIN UR-MCNC: NEGATIVE MG/DL
APPEARANCE UR: CLEAR
APTT PPP: 27 SECONDS (ref 22–38)
BACTERIA #/AREA URNS HPF: ABNORMAL /HPF
BASOPHILS # BLD AUTO: 0 10E3/UL (ref 0–0.2)
BASOPHILS NFR BLD AUTO: 0 %
BILIRUB UR QL STRIP: NEGATIVE
COLOR UR AUTO: ABNORMAL
EOSINOPHIL # BLD AUTO: 0 10E3/UL (ref 0–0.7)
EOSINOPHIL NFR BLD AUTO: 1 %
ERYTHROCYTE [DISTWIDTH] IN BLOOD BY AUTOMATED COUNT: 14.5 % (ref 10–15)
FIBRINOGEN PPP-MCNC: 433 MG/DL (ref 170–510)
GLUCOSE UR STRIP-MCNC: NEGATIVE MG/DL
HCT VFR BLD AUTO: 34.8 % (ref 35–47)
HGB BLD-MCNC: 10.9 G/DL (ref 11.7–15.7)
HGB UR QL STRIP: NEGATIVE
HOLD SPECIMEN: NORMAL
HOLD SPECIMEN: NORMAL
IMM GRANULOCYTES # BLD: 0 10E3/UL
IMM GRANULOCYTES NFR BLD: 0 %
INR PPP: 1.02 (ref 0.85–1.15)
KETONES UR STRIP-MCNC: NEGATIVE MG/DL
LEUKOCYTE ESTERASE UR QL STRIP: NEGATIVE
LYMPHOCYTES # BLD AUTO: 1.2 10E3/UL (ref 0.8–5.3)
LYMPHOCYTES NFR BLD AUTO: 24 %
MCH RBC QN AUTO: 27 PG (ref 26.5–33)
MCHC RBC AUTO-ENTMCNC: 31.3 G/DL (ref 31.5–36.5)
MCV RBC AUTO: 86 FL (ref 78–100)
MONOCYTES # BLD AUTO: 0.3 10E3/UL (ref 0–1.3)
MONOCYTES NFR BLD AUTO: 6 %
NEUTROPHILS # BLD AUTO: 3.5 10E3/UL (ref 1.6–8.3)
NEUTROPHILS NFR BLD AUTO: 70 %
NITRATE UR QL: NEGATIVE
NRBC # BLD AUTO: 0 10E3/UL
NRBC BLD AUTO-RTO: 0 /100
PH UR STRIP: 7 [PH] (ref 5–7)
PLATELET # BLD AUTO: 158 10E3/UL (ref 150–450)
RBC # BLD AUTO: 4.04 10E6/UL (ref 3.8–5.2)
RBC URINE: <1 /HPF
SP GR UR STRIP: 1.01 (ref 1–1.03)
SQUAMOUS EPITHELIAL: 3 /HPF
UROBILINOGEN UR STRIP-MCNC: NORMAL MG/DL
WBC # BLD AUTO: 5.1 10E3/UL (ref 4–11)
WBC URINE: <1 /HPF

## 2024-08-28 PROCEDURE — 59025 FETAL NON-STRESS TEST: CPT

## 2024-08-28 PROCEDURE — 85049 AUTOMATED PLATELET COUNT: CPT | Performed by: OBSTETRICS & GYNECOLOGY

## 2024-08-28 PROCEDURE — 96374 THER/PROPH/DIAG INJ IV PUSH: CPT

## 2024-08-28 PROCEDURE — 96361 HYDRATE IV INFUSION ADD-ON: CPT | Mod: 59

## 2024-08-28 PROCEDURE — 81003 URINALYSIS AUTO W/O SCOPE: CPT | Performed by: OBSTETRICS & GYNECOLOGY

## 2024-08-28 PROCEDURE — 87653 STREP B DNA AMP PROBE: CPT | Performed by: OBSTETRICS & GYNECOLOGY

## 2024-08-28 PROCEDURE — 250N000011 HC RX IP 250 OP 636: Performed by: OBSTETRICS & GYNECOLOGY

## 2024-08-28 PROCEDURE — 96360 HYDRATION IV INFUSION INIT: CPT

## 2024-08-28 PROCEDURE — 85384 FIBRINOGEN ACTIVITY: CPT | Performed by: OBSTETRICS & GYNECOLOGY

## 2024-08-28 PROCEDURE — 36415 COLL VENOUS BLD VENIPUNCTURE: CPT | Performed by: OBSTETRICS & GYNECOLOGY

## 2024-08-28 PROCEDURE — 85730 THROMBOPLASTIN TIME PARTIAL: CPT | Performed by: OBSTETRICS & GYNECOLOGY

## 2024-08-28 PROCEDURE — 85610 PROTHROMBIN TIME: CPT | Performed by: OBSTETRICS & GYNECOLOGY

## 2024-08-28 PROCEDURE — 96372 THER/PROPH/DIAG INJ SC/IM: CPT | Performed by: OBSTETRICS & GYNECOLOGY

## 2024-08-28 PROCEDURE — 258N000003 HC RX IP 258 OP 636: Performed by: OBSTETRICS & GYNECOLOGY

## 2024-08-28 RX ORDER — ONDANSETRON 2 MG/ML
4 INJECTION INTRAMUSCULAR; INTRAVENOUS EVERY 6 HOURS PRN
Status: DISCONTINUED | OUTPATIENT
Start: 2024-08-28 | End: 2024-08-28 | Stop reason: HOSPADM

## 2024-08-28 RX ORDER — PROCHLORPERAZINE 25 MG
25 SUPPOSITORY, RECTAL RECTAL EVERY 12 HOURS PRN
Status: DISCONTINUED | OUTPATIENT
Start: 2024-08-28 | End: 2024-08-28 | Stop reason: HOSPADM

## 2024-08-28 RX ORDER — VITAMIN A, VITAMIN C, VITAMIN D-3, VITAMIN E, VITAMIN B-1, VITAMIN B-2, NIACIN, VITAMIN B-6, CALCIUM, IRON, ZINC, COPPER 4000; 120; 400; 22; 1.84; 3; 20; 10; 1; 12; 200; 27; 25; 2 [IU]/1; MG/1; [IU]/1; MG/1; MG/1; MG/1; MG/1; MG/1; MG/1; UG/1; MG/1; MG/1; MG/1; MG/1
1 TABLET ORAL DAILY
COMMUNITY

## 2024-08-28 RX ORDER — TERBUTALINE SULFATE 1 MG/ML
0.25 INJECTION, SOLUTION SUBCUTANEOUS ONCE
Status: COMPLETED | OUTPATIENT
Start: 2024-08-28 | End: 2024-08-28

## 2024-08-28 RX ORDER — LACTOBACILLUS RHAMNOSUS GG 10B CELL
1 CAPSULE ORAL 2 TIMES DAILY
Status: ON HOLD | COMMUNITY
End: 2024-09-14

## 2024-08-28 RX ORDER — METOCLOPRAMIDE 10 MG/1
10 TABLET ORAL EVERY 6 HOURS PRN
Status: DISCONTINUED | OUTPATIENT
Start: 2024-08-28 | End: 2024-08-28 | Stop reason: HOSPADM

## 2024-08-28 RX ORDER — TERBUTALINE SULFATE 1 MG/ML
INJECTION, SOLUTION SUBCUTANEOUS
Status: DISCONTINUED
Start: 2024-08-28 | End: 2024-08-28 | Stop reason: HOSPADM

## 2024-08-28 RX ORDER — SERTRALINE HYDROCHLORIDE 25 MG/1
2 TABLET, FILM COATED ORAL
COMMUNITY
Start: 2024-07-25

## 2024-08-28 RX ORDER — METOCLOPRAMIDE HYDROCHLORIDE 5 MG/ML
10 INJECTION INTRAMUSCULAR; INTRAVENOUS EVERY 6 HOURS PRN
Status: DISCONTINUED | OUTPATIENT
Start: 2024-08-28 | End: 2024-08-28 | Stop reason: HOSPADM

## 2024-08-28 RX ORDER — ONDANSETRON 4 MG/1
4 TABLET, ORALLY DISINTEGRATING ORAL EVERY 6 HOURS PRN
Status: DISCONTINUED | OUTPATIENT
Start: 2024-08-28 | End: 2024-08-28 | Stop reason: HOSPADM

## 2024-08-28 RX ORDER — PROCHLORPERAZINE MALEATE 5 MG
10 TABLET ORAL EVERY 6 HOURS PRN
Status: DISCONTINUED | OUTPATIENT
Start: 2024-08-28 | End: 2024-08-28 | Stop reason: HOSPADM

## 2024-08-28 RX ORDER — ENOXAPARIN SODIUM 100 MG/ML
40 INJECTION SUBCUTANEOUS DAILY
COMMUNITY
Start: 2024-08-22

## 2024-08-28 RX ADMIN — TERBUTALINE SULFATE 0.25 MG: 1 INJECTION SUBCUTANEOUS at 13:53

## 2024-08-28 RX ADMIN — SODIUM CHLORIDE, POTASSIUM CHLORIDE, SODIUM LACTATE AND CALCIUM CHLORIDE 1000 ML: 600; 310; 30; 20 INJECTION, SOLUTION INTRAVENOUS at 14:06

## 2024-08-28 ASSESSMENT — ACTIVITIES OF DAILY LIVING (ADL)
ADLS_ACUITY_SCORE: 18

## 2024-08-28 NOTE — PLAN OF CARE
Pt states she's feeling better. Dr Meyers updated on pt and fetal status. Pt may discharge to home and follow up in clinic this week. Pt agrees with this plan. All questions answered.

## 2024-08-28 NOTE — DISCHARGE INSTRUCTIONS
Learning About When to Call Your Doctor During Pregnancy (After 20 Weeks)  Overview  It's common to have concerns about what might be a problem when you're pregnant. Most pregnancies don't have any serious problems. But it's still important to know when to call your doctor if you have certain symptoms or signs of labor.  These are general suggestions. Your doctor may give you some more information about when to call.  When to call your doctor (after 20 weeks)  Call 911  anytime you think you may need emergency care. For example, call if:  You have severe vaginal bleeding. This means you are soaking through a pad each hour for 2 or more hours.  You have sudden, severe pain in your belly.  You have chest pain, are short of breath, or cough up blood.  You passed out (lost consciousness).  You have a seizure.  You see or feel the umbilical cord.  You think you are about to deliver your baby and can't make it safely to the hospital or birthing center.  Call your doctor now or seek immediate medical care if:  You have vaginal bleeding.  You have belly pain.  You have a fever.  You are dizzy or lightheaded, or you feel like you may faint.  You have signs of a blood clot in your leg (called a deep vein thrombosis), such as:  Pain in the calf, back of the knee, thigh, or groin.  Swelling in your leg or groin.  A color change on the leg or groin. The skin may be reddish or purplish, depending on your usual skin color.  You have symptoms of preeclampsia, such as:  Sudden swelling of your face, hands, or feet.  New vision problems (such as dimness, blurring, or seeing spots).  A severe headache.  You have a sudden release of fluid from your vagina. (You think your water broke.)  You've been having regular contractions for an hour. This means that you've had at least 6 contractions within 1 hour, even after you change your position and drink fluids.  You notice that your baby has stopped moving or is moving less than  "normal.  You have signs of heart failure, such as:  New or increased shortness of breath.  New or worse swelling in your legs, ankles, or feet.  Sudden weight gain, such as more than 2 to 3 pounds in a day or 5 pounds in a week.  Feeling so tired or weak that you cannot do your usual activities.  You have symptoms of a urinary tract infection. These may include:  Pain or burning when you urinate.  A frequent need to urinate without being able to pass much urine.  Pain in the flank, which is just below the rib cage and above the waist on either side of the back.  Blood in your urine.  Watch closely for changes in your health, and be sure to contact your doctor if:  You have vaginal discharge that smells bad.  You feel sad, anxious, or hopeless for more than a few days.  You have skin changes, such as a rash, itching, or a yellow color to your skin.  You have other concerns about your pregnancy.  If you have labor signs at 37 weeks or more  If you have signs of labor at 37 weeks or more, your doctor may tell you to call when your labor becomes more active. Symptoms of active labor include:  Contractions that are regular.  Contractions that are less than 5 minutes apart.  Contractions that are hard to talk through.  Follow-up care is a key part of your treatment and safety. Be sure to make and go to all appointments, and call your doctor if you are having problems. It's also a good idea to know your test results and keep a list of the medicines you take.  Where can you learn more?  Go to https://www.Box[employer].net/patiented  Enter N531 in the search box to learn more about \"Learning About When to Call Your Doctor During Pregnancy (After 20 Weeks).\"  Current as of: July 10, 2023  Content Version: 14.1    6807-8911 [employer].   Care instructions adapted under license by your healthcare professional. If you have questions about a medical condition or this instruction, always ask your healthcare " professional. [email or web address] disclaims any warranty or liability for your use of this information.

## 2024-08-28 NOTE — PLAN OF CARE
Pt of Dr Singh [place] [facility] [place]. Pt reports feeling a dull cramping sensation right along her pubic bone. The dull aching intensifies with movement. Pt reports baby is active. Denies vaginal bleeding or LOF. Pt first baby delivered at 39+ weeks, no history of  labor. Pt states her pregnancy has been uncomplicated.  RN stood at bedside to eval during contraction and pt said it felt tight, but not the intense feeling she feels with walking and or movement.   Dr Meyers notified of pt and fetal status, orders STAT CBC w diff, PT PTT Fibrinogen, UA, GBS culture, IV hydration and one subcutaneous dose of Terb ordered by Dr Meyers.

## 2024-08-29 LAB — GP B STREP DNA SPEC QL NAA+PROBE: NEGATIVE

## 2024-09-13 ENCOUNTER — ANESTHESIA EVENT (OUTPATIENT)
Dept: OBGYN | Facility: CLINIC | Age: 34
End: 2024-09-13
Payer: COMMERCIAL

## 2024-09-13 ENCOUNTER — ANESTHESIA (OUTPATIENT)
Dept: OBGYN | Facility: CLINIC | Age: 34
End: 2024-09-13
Payer: COMMERCIAL

## 2024-09-13 ENCOUNTER — HOSPITAL ENCOUNTER (INPATIENT)
Facility: CLINIC | Age: 34
LOS: 1 days | Discharge: HOME | End: 2024-09-14
Attending: OBSTETRICS & GYNECOLOGY | Admitting: OBSTETRICS & GYNECOLOGY
Payer: COMMERCIAL

## 2024-09-13 PROBLEM — Z36.89 ENCOUNTER FOR TRIAGE IN PREGNANT PATIENT: Status: ACTIVE | Noted: 2024-09-13

## 2024-09-13 LAB
ABO/RH(D): NORMAL
ANTIBODY SCREEN: NEGATIVE
BASOPHILS # BLD AUTO: 0 10E3/UL (ref 0–0.2)
BASOPHILS NFR BLD AUTO: 0 %
CREAT SERPL-MCNC: 0.62 MG/DL (ref 0.51–0.95)
EGFRCR SERPLBLD CKD-EPI 2021: >90 ML/MIN/1.73M2
EOSINOPHIL # BLD AUTO: 0.1 10E3/UL (ref 0–0.7)
EOSINOPHIL NFR BLD AUTO: 1 %
ERYTHROCYTE [DISTWIDTH] IN BLOOD BY AUTOMATED COUNT: 14.9 % (ref 10–15)
HCT VFR BLD AUTO: 37.5 % (ref 35–47)
HGB BLD-MCNC: 11.5 G/DL (ref 11.7–15.7)
IMM GRANULOCYTES # BLD: 0 10E3/UL
IMM GRANULOCYTES NFR BLD: 0 %
LYMPHOCYTES # BLD AUTO: 1.7 10E3/UL (ref 0.8–5.3)
LYMPHOCYTES NFR BLD AUTO: 31 %
MCH RBC QN AUTO: 26.3 PG (ref 26.5–33)
MCHC RBC AUTO-ENTMCNC: 30.7 G/DL (ref 31.5–36.5)
MCV RBC AUTO: 86 FL (ref 78–100)
MONOCYTES # BLD AUTO: 0.3 10E3/UL (ref 0–1.3)
MONOCYTES NFR BLD AUTO: 5 %
NEUTROPHILS # BLD AUTO: 3.4 10E3/UL (ref 1.6–8.3)
NEUTROPHILS NFR BLD AUTO: 62 %
NRBC # BLD AUTO: 0 10E3/UL
NRBC BLD AUTO-RTO: 0 /100
PLATELET # BLD AUTO: 175 10E3/UL (ref 150–450)
PLATELET # BLD AUTO: 181 10E3/UL (ref 150–450)
RBC # BLD AUTO: 4.38 10E6/UL (ref 3.8–5.2)
RUPTURE OF FETAL MEMBRANES BY ROM PLUS: POSITIVE
SPECIMEN EXPIRATION DATE: NORMAL
T PALLIDUM AB SER QL: NONREACTIVE
WBC # BLD AUTO: 5.5 10E3/UL (ref 4–11)

## 2024-09-13 PROCEDURE — 0HQ9XZZ REPAIR PERINEUM SKIN, EXTERNAL APPROACH: ICD-10-PCS | Performed by: OBSTETRICS & GYNECOLOGY

## 2024-09-13 PROCEDURE — 258N000003 HC RX IP 258 OP 636: Performed by: OBSTETRICS & GYNECOLOGY

## 2024-09-13 PROCEDURE — 250N000011 HC RX IP 250 OP 636: Performed by: OBSTETRICS & GYNECOLOGY

## 2024-09-13 PROCEDURE — 82565 ASSAY OF CREATININE: CPT | Performed by: OBSTETRICS & GYNECOLOGY

## 2024-09-13 PROCEDURE — 10D17Z9 MANUAL EXTRACTION OF PRODUCTS OF CONCEPTION, RETAINED, VIA NATURAL OR ARTIFICIAL OPENING: ICD-10-PCS | Performed by: OBSTETRICS & GYNECOLOGY

## 2024-09-13 PROCEDURE — 59400 OBSTETRICAL CARE: CPT | Performed by: ANESTHESIOLOGY

## 2024-09-13 PROCEDURE — 84112 EVAL AMNIOTIC FLUID PROTEIN: CPT | Performed by: OBSTETRICS & GYNECOLOGY

## 2024-09-13 PROCEDURE — 36415 COLL VENOUS BLD VENIPUNCTURE: CPT | Performed by: OBSTETRICS & GYNECOLOGY

## 2024-09-13 PROCEDURE — 86780 TREPONEMA PALLIDUM: CPT | Performed by: OBSTETRICS & GYNECOLOGY

## 2024-09-13 PROCEDURE — 85049 AUTOMATED PLATELET COUNT: CPT | Performed by: OBSTETRICS & GYNECOLOGY

## 2024-09-13 PROCEDURE — 722N000001 HC LABOR CARE VAGINAL DELIVERY SINGLE

## 2024-09-13 PROCEDURE — 00HU33Z INSERTION OF INFUSION DEVICE INTO SPINAL CANAL, PERCUTANEOUS APPROACH: ICD-10-PCS | Performed by: ANESTHESIOLOGY

## 2024-09-13 PROCEDURE — 86900 BLOOD TYPING SEROLOGIC ABO: CPT | Performed by: OBSTETRICS & GYNECOLOGY

## 2024-09-13 PROCEDURE — G0463 HOSPITAL OUTPT CLINIC VISIT: HCPCS

## 2024-09-13 PROCEDURE — 120N000012 HC R&B POSTPARTUM

## 2024-09-13 PROCEDURE — 85025 COMPLETE CBC W/AUTO DIFF WBC: CPT | Performed by: OBSTETRICS & GYNECOLOGY

## 2024-09-13 PROCEDURE — 3E0R3BZ INTRODUCTION OF ANESTHETIC AGENT INTO SPINAL CANAL, PERCUTANEOUS APPROACH: ICD-10-PCS | Performed by: ANESTHESIOLOGY

## 2024-09-13 PROCEDURE — 88307 TISSUE EXAM BY PATHOLOGIST: CPT | Mod: TC | Performed by: OBSTETRICS & GYNECOLOGY

## 2024-09-13 PROCEDURE — 370N000003 HC ANESTHESIA WARD SERVICE: Performed by: ANESTHESIOLOGY

## 2024-09-13 PROCEDURE — 250N000013 HC RX MED GY IP 250 OP 250 PS 637: Performed by: OBSTETRICS & GYNECOLOGY

## 2024-09-13 PROCEDURE — 250N000009 HC RX 250: Performed by: OBSTETRICS & GYNECOLOGY

## 2024-09-13 PROCEDURE — 250N000011 HC RX IP 250 OP 636: Performed by: ANESTHESIOLOGY

## 2024-09-13 RX ORDER — CEFAZOLIN SODIUM 1 G/3ML
1 INJECTION, POWDER, FOR SOLUTION INTRAMUSCULAR; INTRAVENOUS EVERY 8 HOURS
Status: COMPLETED | OUTPATIENT
Start: 2024-09-13 | End: 2024-09-14

## 2024-09-13 RX ORDER — OXYTOCIN 10 [USP'U]/ML
10 INJECTION, SOLUTION INTRAMUSCULAR; INTRAVENOUS
Status: DISCONTINUED | OUTPATIENT
Start: 2024-09-13 | End: 2024-09-14 | Stop reason: HOSPADM

## 2024-09-13 RX ORDER — OXYTOCIN 10 [USP'U]/ML
10 INJECTION, SOLUTION INTRAMUSCULAR; INTRAVENOUS
Status: DISCONTINUED | OUTPATIENT
Start: 2024-09-13 | End: 2024-09-13

## 2024-09-13 RX ORDER — METOCLOPRAMIDE HYDROCHLORIDE 5 MG/ML
10 INJECTION INTRAMUSCULAR; INTRAVENOUS EVERY 6 HOURS PRN
Status: DISCONTINUED | OUTPATIENT
Start: 2024-09-13 | End: 2024-09-13

## 2024-09-13 RX ORDER — CITRIC ACID/SODIUM CITRATE 334-500MG
30 SOLUTION, ORAL ORAL
Status: DISCONTINUED | OUTPATIENT
Start: 2024-09-13 | End: 2024-09-13

## 2024-09-13 RX ORDER — PROCHLORPERAZINE 25 MG
25 SUPPOSITORY, RECTAL RECTAL EVERY 12 HOURS PRN
Status: DISCONTINUED | OUTPATIENT
Start: 2024-09-13 | End: 2024-09-13

## 2024-09-13 RX ORDER — IBUPROFEN 400 MG/1
800 TABLET, FILM COATED ORAL EVERY 6 HOURS PRN
Status: DISCONTINUED | OUTPATIENT
Start: 2024-09-13 | End: 2024-09-14 | Stop reason: HOSPADM

## 2024-09-13 RX ORDER — DOCUSATE SODIUM 100 MG/1
100 CAPSULE, LIQUID FILLED ORAL DAILY
Status: DISCONTINUED | OUTPATIENT
Start: 2024-09-13 | End: 2024-09-14 | Stop reason: HOSPADM

## 2024-09-13 RX ORDER — LOPERAMIDE HCL 2 MG
4 CAPSULE ORAL
Status: DISCONTINUED | OUTPATIENT
Start: 2024-09-13 | End: 2024-09-14 | Stop reason: HOSPADM

## 2024-09-13 RX ORDER — CLINDAMYCIN PHOSPHATE 900 MG/50ML
900 INJECTION, SOLUTION INTRAVENOUS EVERY 8 HOURS
Status: DISCONTINUED | OUTPATIENT
Start: 2024-09-13 | End: 2024-09-13 | Stop reason: ALTCHOICE

## 2024-09-13 RX ORDER — LIDOCAINE 40 MG/G
CREAM TOPICAL
Status: DISCONTINUED | OUTPATIENT
Start: 2024-09-13 | End: 2024-09-13

## 2024-09-13 RX ORDER — ONDANSETRON 4 MG/1
4 TABLET, ORALLY DISINTEGRATING ORAL EVERY 6 HOURS PRN
Status: DISCONTINUED | OUTPATIENT
Start: 2024-09-13 | End: 2024-09-13

## 2024-09-13 RX ORDER — OXYTOCIN/0.9 % SODIUM CHLORIDE 30/500 ML
340 PLASTIC BAG, INJECTION (ML) INTRAVENOUS CONTINUOUS PRN
Status: DISCONTINUED | OUTPATIENT
Start: 2024-09-13 | End: 2024-09-13

## 2024-09-13 RX ORDER — TRANEXAMIC ACID 10 MG/ML
1 INJECTION, SOLUTION INTRAVENOUS EVERY 30 MIN PRN
Status: DISCONTINUED | OUTPATIENT
Start: 2024-09-13 | End: 2024-09-13

## 2024-09-13 RX ORDER — TRANEXAMIC ACID 10 MG/ML
1 INJECTION, SOLUTION INTRAVENOUS EVERY 30 MIN PRN
Status: DISCONTINUED | OUTPATIENT
Start: 2024-09-13 | End: 2024-09-14 | Stop reason: HOSPADM

## 2024-09-13 RX ORDER — SODIUM CHLORIDE, SODIUM LACTATE, POTASSIUM CHLORIDE, CALCIUM CHLORIDE 600; 310; 30; 20 MG/100ML; MG/100ML; MG/100ML; MG/100ML
INJECTION, SOLUTION INTRAVENOUS CONTINUOUS
Status: DISCONTINUED | OUTPATIENT
Start: 2024-09-13 | End: 2024-09-13

## 2024-09-13 RX ORDER — METOCLOPRAMIDE 10 MG/1
10 TABLET ORAL EVERY 6 HOURS PRN
Status: DISCONTINUED | OUTPATIENT
Start: 2024-09-13 | End: 2024-09-13

## 2024-09-13 RX ORDER — METHYLERGONOVINE MALEATE 0.2 MG/ML
200 INJECTION INTRAVENOUS
Status: DISCONTINUED | OUTPATIENT
Start: 2024-09-13 | End: 2024-09-13

## 2024-09-13 RX ORDER — FENTANYL CITRATE-0.9 % NACL/PF 10 MCG/ML
100 PLASTIC BAG, INJECTION (ML) INTRAVENOUS EVERY 5 MIN PRN
Status: DISCONTINUED | OUTPATIENT
Start: 2024-09-13 | End: 2024-09-13

## 2024-09-13 RX ORDER — NALOXONE HYDROCHLORIDE 0.4 MG/ML
0.2 INJECTION, SOLUTION INTRAMUSCULAR; INTRAVENOUS; SUBCUTANEOUS
Status: DISCONTINUED | OUTPATIENT
Start: 2024-09-13 | End: 2024-09-13

## 2024-09-13 RX ORDER — PROCHLORPERAZINE MALEATE 5 MG
10 TABLET ORAL EVERY 6 HOURS PRN
Status: DISCONTINUED | OUTPATIENT
Start: 2024-09-13 | End: 2024-09-13

## 2024-09-13 RX ORDER — HYDROCORTISONE 25 MG/G
CREAM TOPICAL 3 TIMES DAILY PRN
Status: DISCONTINUED | OUTPATIENT
Start: 2024-09-13 | End: 2024-09-14 | Stop reason: HOSPADM

## 2024-09-13 RX ORDER — NALOXONE HYDROCHLORIDE 0.4 MG/ML
0.4 INJECTION, SOLUTION INTRAMUSCULAR; INTRAVENOUS; SUBCUTANEOUS
Status: DISCONTINUED | OUTPATIENT
Start: 2024-09-13 | End: 2024-09-13

## 2024-09-13 RX ORDER — ACETAMINOPHEN 325 MG/1
650 TABLET ORAL EVERY 4 HOURS PRN
Status: DISCONTINUED | OUTPATIENT
Start: 2024-09-13 | End: 2024-09-13

## 2024-09-13 RX ORDER — LOPERAMIDE HCL 2 MG
2 CAPSULE ORAL
Status: DISCONTINUED | OUTPATIENT
Start: 2024-09-13 | End: 2024-09-13

## 2024-09-13 RX ORDER — MISOPROSTOL 200 UG/1
400 TABLET ORAL
Status: DISCONTINUED | OUTPATIENT
Start: 2024-09-13 | End: 2024-09-13

## 2024-09-13 RX ORDER — METOCLOPRAMIDE 10 MG/1
10 TABLET ORAL EVERY 6 HOURS PRN
Status: DISCONTINUED | OUTPATIENT
Start: 2024-09-13 | End: 2024-09-13 | Stop reason: HOSPADM

## 2024-09-13 RX ORDER — ENOXAPARIN SODIUM 100 MG/ML
40 INJECTION SUBCUTANEOUS EVERY 24 HOURS
Status: DISCONTINUED | OUTPATIENT
Start: 2024-09-14 | End: 2024-09-14 | Stop reason: HOSPADM

## 2024-09-13 RX ORDER — CEFAZOLIN SODIUM 1 G/3ML
INJECTION, POWDER, FOR SOLUTION INTRAMUSCULAR; INTRAVENOUS
Status: COMPLETED
Start: 2024-09-13 | End: 2024-09-13

## 2024-09-13 RX ORDER — ACETAMINOPHEN 325 MG/1
650 TABLET ORAL EVERY 4 HOURS PRN
Status: DISCONTINUED | OUTPATIENT
Start: 2024-09-13 | End: 2024-09-14 | Stop reason: HOSPADM

## 2024-09-13 RX ORDER — OXYTOCIN/0.9 % SODIUM CHLORIDE 30/500 ML
340 PLASTIC BAG, INJECTION (ML) INTRAVENOUS CONTINUOUS PRN
Status: DISCONTINUED | OUTPATIENT
Start: 2024-09-13 | End: 2024-09-14 | Stop reason: HOSPADM

## 2024-09-13 RX ORDER — BISACODYL 10 MG
10 SUPPOSITORY, RECTAL RECTAL DAILY PRN
Status: DISCONTINUED | OUTPATIENT
Start: 2024-09-13 | End: 2024-09-14 | Stop reason: HOSPADM

## 2024-09-13 RX ORDER — SODIUM CHLORIDE, SODIUM LACTATE, POTASSIUM CHLORIDE, CALCIUM CHLORIDE 600; 310; 30; 20 MG/100ML; MG/100ML; MG/100ML; MG/100ML
INJECTION, SOLUTION INTRAVENOUS CONTINUOUS PRN
Status: DISCONTINUED | OUTPATIENT
Start: 2024-09-13 | End: 2024-09-13

## 2024-09-13 RX ORDER — CARBOPROST TROMETHAMINE 250 UG/ML
250 INJECTION, SOLUTION INTRAMUSCULAR
Status: DISCONTINUED | OUTPATIENT
Start: 2024-09-13 | End: 2024-09-13

## 2024-09-13 RX ORDER — OXYTOCIN/0.9 % SODIUM CHLORIDE 30/500 ML
1-24 PLASTIC BAG, INJECTION (ML) INTRAVENOUS CONTINUOUS
Status: DISCONTINUED | OUTPATIENT
Start: 2024-09-13 | End: 2024-09-13

## 2024-09-13 RX ORDER — ONDANSETRON 2 MG/ML
4 INJECTION INTRAMUSCULAR; INTRAVENOUS EVERY 6 HOURS PRN
Status: DISCONTINUED | OUTPATIENT
Start: 2024-09-13 | End: 2024-09-13 | Stop reason: HOSPADM

## 2024-09-13 RX ORDER — METOCLOPRAMIDE HYDROCHLORIDE 5 MG/ML
10 INJECTION INTRAMUSCULAR; INTRAVENOUS EVERY 6 HOURS PRN
Status: DISCONTINUED | OUTPATIENT
Start: 2024-09-13 | End: 2024-09-13 | Stop reason: HOSPADM

## 2024-09-13 RX ORDER — CARBOPROST TROMETHAMINE 250 UG/ML
250 INJECTION, SOLUTION INTRAMUSCULAR
Status: DISCONTINUED | OUTPATIENT
Start: 2024-09-13 | End: 2024-09-14 | Stop reason: HOSPADM

## 2024-09-13 RX ORDER — PROCHLORPERAZINE MALEATE 5 MG
10 TABLET ORAL EVERY 6 HOURS PRN
Status: DISCONTINUED | OUTPATIENT
Start: 2024-09-13 | End: 2024-09-13 | Stop reason: HOSPADM

## 2024-09-13 RX ORDER — PROCHLORPERAZINE 25 MG
25 SUPPOSITORY, RECTAL RECTAL EVERY 12 HOURS PRN
Status: DISCONTINUED | OUTPATIENT
Start: 2024-09-13 | End: 2024-09-13 | Stop reason: HOSPADM

## 2024-09-13 RX ORDER — MISOPROSTOL 200 UG/1
800 TABLET ORAL
Status: DISCONTINUED | OUTPATIENT
Start: 2024-09-13 | End: 2024-09-14 | Stop reason: HOSPADM

## 2024-09-13 RX ORDER — KETOROLAC TROMETHAMINE 30 MG/ML
30 INJECTION, SOLUTION INTRAMUSCULAR; INTRAVENOUS
Status: DISCONTINUED | OUTPATIENT
Start: 2024-09-13 | End: 2024-09-13

## 2024-09-13 RX ORDER — MODIFIED LANOLIN
OINTMENT (GRAM) TOPICAL
Status: DISCONTINUED | OUTPATIENT
Start: 2024-09-13 | End: 2024-09-14 | Stop reason: HOSPADM

## 2024-09-13 RX ORDER — SERTRALINE HYDROCHLORIDE 25 MG/1
50 TABLET, FILM COATED ORAL DAILY
Status: DISCONTINUED | OUTPATIENT
Start: 2024-09-14 | End: 2024-09-13

## 2024-09-13 RX ORDER — MISOPROSTOL 200 UG/1
800 TABLET ORAL
Status: DISCONTINUED | OUTPATIENT
Start: 2024-09-13 | End: 2024-09-13

## 2024-09-13 RX ORDER — MISOPROSTOL 200 UG/1
400 TABLET ORAL
Status: DISCONTINUED | OUTPATIENT
Start: 2024-09-13 | End: 2024-09-14 | Stop reason: HOSPADM

## 2024-09-13 RX ORDER — NALBUPHINE HYDROCHLORIDE 10 MG/ML
2.5-5 INJECTION, SOLUTION INTRAMUSCULAR; INTRAVENOUS; SUBCUTANEOUS EVERY 6 HOURS PRN
Status: DISCONTINUED | OUTPATIENT
Start: 2024-09-13 | End: 2024-09-13

## 2024-09-13 RX ORDER — LOPERAMIDE HCL 2 MG
4 CAPSULE ORAL
Status: DISCONTINUED | OUTPATIENT
Start: 2024-09-13 | End: 2024-09-13

## 2024-09-13 RX ORDER — METHYLERGONOVINE MALEATE 0.2 MG/ML
200 INJECTION INTRAVENOUS
Status: DISCONTINUED | OUTPATIENT
Start: 2024-09-13 | End: 2024-09-14 | Stop reason: HOSPADM

## 2024-09-13 RX ORDER — OXYTOCIN/0.9 % SODIUM CHLORIDE 30/500 ML
100-340 PLASTIC BAG, INJECTION (ML) INTRAVENOUS CONTINUOUS PRN
Status: DISCONTINUED | OUTPATIENT
Start: 2024-09-13 | End: 2024-09-13

## 2024-09-13 RX ORDER — LOPERAMIDE HCL 2 MG
2 CAPSULE ORAL
Status: DISCONTINUED | OUTPATIENT
Start: 2024-09-13 | End: 2024-09-14 | Stop reason: HOSPADM

## 2024-09-13 RX ORDER — IBUPROFEN 400 MG/1
800 TABLET, FILM COATED ORAL
Status: DISCONTINUED | OUTPATIENT
Start: 2024-09-13 | End: 2024-09-13

## 2024-09-13 RX ORDER — ONDANSETRON 2 MG/ML
4 INJECTION INTRAMUSCULAR; INTRAVENOUS EVERY 6 HOURS PRN
Status: DISCONTINUED | OUTPATIENT
Start: 2024-09-13 | End: 2024-09-13

## 2024-09-13 RX ORDER — ONDANSETRON 4 MG/1
4 TABLET, ORALLY DISINTEGRATING ORAL EVERY 6 HOURS PRN
Status: DISCONTINUED | OUTPATIENT
Start: 2024-09-13 | End: 2024-09-13 | Stop reason: HOSPADM

## 2024-09-13 RX ADMIN — Medication 2 MILLI-UNITS/MIN: at 10:54

## 2024-09-13 RX ADMIN — BENZOCAINE: 11.4 AEROSOL, SPRAY TOPICAL at 20:43

## 2024-09-13 RX ADMIN — Medication: at 12:20

## 2024-09-13 RX ADMIN — CEFAZOLIN 1 G: 1 INJECTION, POWDER, FOR SOLUTION INTRAMUSCULAR; INTRAVENOUS at 18:21

## 2024-09-13 RX ADMIN — Medication 100 MCG: at 12:45

## 2024-09-13 RX ADMIN — ACETAMINOPHEN 650 MG: 325 TABLET ORAL at 19:57

## 2024-09-13 RX ADMIN — IBUPROFEN 800 MG: 400 TABLET, FILM COATED ORAL at 19:57

## 2024-09-13 RX ADMIN — DOCUSATE SODIUM 100 MG: 100 CAPSULE, LIQUID FILLED ORAL at 18:22

## 2024-09-13 RX ADMIN — SODIUM CHLORIDE, POTASSIUM CHLORIDE, SODIUM LACTATE AND CALCIUM CHLORIDE: 600; 310; 30; 20 INJECTION, SOLUTION INTRAVENOUS at 10:54

## 2024-09-13 ASSESSMENT — ACTIVITIES OF DAILY LIVING (ADL)
ADLS_ACUITY_SCORE: 18
ADLS_ACUITY_SCORE: 26
ADLS_ACUITY_SCORE: 18
ADLS_ACUITY_SCORE: 18
ADLS_ACUITY_SCORE: 26
ADLS_ACUITY_SCORE: 18
ADLS_ACUITY_SCORE: 26
ADLS_ACUITY_SCORE: 18

## 2024-09-13 NOTE — PLAN OF CARE
Pt progressed quickly to completely dilated.  Dr Tello to bedside.  Pushed once, delivered liveborn male.  Manual removal of placenta.  Placenta sent to path.  Vss, afebrile.  Fundus firm, scant flow.  Ice pack to repaired perineum.  Breastfeeding and bonding well.   present and supportive at bedside.  Report given to PP RN taking over.

## 2024-09-13 NOTE — PROGRESS NOTES
Data: Patient presented to [facility]: 2024  5:07 AM.  Reason for maternal/fetal assessment is vaginal bleeding. Patient reports bleeding and possible fluid leaking at 0300 this morning. Patient denies abdominal pain. Patient reports fetal movement is normal. Patient is a 37w0d .  Prenatal record reviewed. Pregnancy has been uncomplicated.    Vital signs wnl. Support person is present.     Action: Verbal consent for EFM. Triage assessment completed.     Response: Patient verbalized agreement with plan. Will contact Dr. Singh with update and further orders.    Dr. Singh updated regarding bleeding/fluid. /-2. ROM + positive. Contractions every 4-6 minutes. Plan to admit patient and recheck cervix in 2 hours to update Dr. Ray.     Pt transferred to room 213 and report given to FROILAN Salinas.

## 2024-09-13 NOTE — H&P
Labor and Delivery Admission Note  Chief Complaint:   Chief Complaint   Patient presents with    Vaginal Bleeding      History of Present Illness: Raven Matt is a 34 year old  at 37w0d who presents with SROM.    Vital Signs: /63   Temp 97.5  F (36.4  C) (Temporal)   Resp 16    Physical Exam: General: NAD, mood appropriate  Cardiovascular: Regular rate  Pulmonary: Non-labored  Abdomen: Gravid, non-tender  Extremities: Warm and well perfused  Fetal Presentation: Vertex  Cervical Exam:  2/70/-2, changed from 1 in clinic   Fetal Assessment: moderate variablility, + accels, no decels, Category I    Assessment and Plan:  Raven Matt is a 34 year old  being admitted to L&D for SROM  - expectant mgmt at this time    # h/o DVST  - last lvnx dose was y'day morning  - Discussed epidural timing  - continue lvnx pp    # Anxiety  - stable on zoloft 50mg, had discussed increase at delivery at [facility]    FAYE MIRANDA MD  2024, 7:42 AM

## 2024-09-13 NOTE — H&P
2024      33 yo  presented to L&D at 37 w 0 d with PROM.  She states she started having LOF at ~ 3:00 am.  Upon arrive to [facility], she was found to be ROM+ positive, SVE 2/70/-2, with some UCs.  She was admitted for expectant management. She did not make cervical change over the next few hours, and so pitocin was started per protocol.  Her prenatal care was complicated by: 1. H/o DVT (dural venous sinus thrombosis) on lovenox 40 mg subcutaneous Qday this pregnancy, 2. H/o retained placental tissue last pregnancy, 3. AMA, 4. Anxiety, 5. Iron deficiency.      PNLs: A pos, ABS neg, RI, RPR NR, HIV NR, HepBsAg neg, GBS neg    PEX:  Gen: comfortable with epidural  Abd: gravid, NT  SVE: Complete/+3  FHT: cat 1  TOCO: Q2-3 min      A/P: 33 yo  @ 37.0 wga presented for ROM, labor. AVSS.  - start second stage  - GBS neg  - anticipate       PETER MARX MD

## 2024-09-13 NOTE — PLAN OF CARE
Goal Outcome Evaluation:  Delivered via , manual removal of placenta by Dr Joy Del Cid

## 2024-09-13 NOTE — PLAN OF CARE
Goal Outcome Evaluation:      Plan of Care Reviewed With: patient    Overall Patient Progress: improvingOverall Patient Progress: improving    Outcome Evaluation: Patient admitted to Labor and Delivery, no complaints of pain, plan for vaginal delivery    Report from Kavita BREWER RN at 0600. Patient ambulated to room 213. Patient settled and orientated to room. No concerns at this time, plan of care continues. Plan per provider is to repeat SVE at 0800 and page Dr. REMY Ray    Problem: Adult Inpatient Plan of Care  Goal: Plan of Care Review  Description: The Plan of Care Review/Shift note should be completed every shift.  The Outcome Evaluation is a brief statement about your assessment that the patient is improving, declining, or no change.  This information will be displayed automatically on your shift  note.  Outcome: Progressing  Flowsheets (Taken 9/13/2024 0643)  Outcome Evaluation: Patient admitted to Labor and Delivery, no complaints of pain, plan for vaginal delivery  Plan of Care Reviewed With: patient  Overall Patient Progress: improving  Goal: Optimal Comfort and Wellbeing  Intervention: Provide Person-Centered Care  Recent Flowsheet Documentation  Taken 9/13/2024 0639 by Rita Lopez, RN  Trust Relationship/Rapport:   care explained   choices provided   questions answered   questions encouraged

## 2024-09-13 NOTE — PLAN OF CARE
Writer assumed care at 0715.  Category 1 tracing.  Vss, afebrile.  Pt uncomfortable with irregular ctx but coping well.  Declines pain meds.  No cervical changed.  Pitocin started per Dr's order.  Pitocin started per protocol.   present and supportive at bedside.  Will continue to monitor and assess.

## 2024-09-13 NOTE — L&D DELIVERY NOTE
Delivery Note  Diagnosis: Intrauterine pregnancy at 37w0d, SROM  Procedure: Vaginal delivery  Findings: Liveborn male infant, Apgars were 7  and 9  at 1 and 5 minutes respectively. Infant weight: 6 lb 13 oz  Anesthesia: Epidural    QBL: Delivery QBL (mL): 536    Raven Matt is a 34 year old  female at 37w0d weeks gestation. Her pregnancy was notable for history of dural sinus venous thrombosis (on prophylactic lovenox in pregnancy), AMA, anxiety, iron deficiency. She also reports an issue when she had delayed diagnosis of retained placental tissue after her last pregnancy (she presented for infertility work-up with [facility] and hysteroscopy revealed retained placental tissue) . She presented with SROM.. NST was reactive upon admission. She initially did not make cervical change and so pitocin per protocol was started.  She underwent epidural placement with adequate relief.  The patient progressed to complete and delivered a viable male infant without complication. There was no nuchal cord, and the infant was placed on the patient's abdomen. Cord clamping was delayed by 1 minutes, at which time the cord was doubly clamped and cut. The third stage was actively managed with external uterine massage, gentle cord traction and pitocin. However, the placenta would not separate initially.  First degree laceration noted that was repaired with 3-0 vicryl.  Attempt was made for placental delivery, however again did not separate. Due to previous history of retained placental tissue, decision made to proceed with manual extraction.  This was initially difficult due to uterus brijesh down, making it hard to advance hand to the fundus. However, eventually the placenta was able to be manually  from the uterus and removed.  Due to difficult removal, bedside ultrasound was performed, and did not reveal any evidence of retained tissue and no vascularity within the cavity.  Uterus remained firm with massage and  pitocin.  Reinspection of the genital tract revealed superficial lacerations BL periurethral, which were repaired in a continuous running fashion with 3-0 vicryl.  Reinspection of repair sites revealed excellent hemostasis, and inspection of cervix and remainder of vagina did not reveal any further lacerations. All counts were correct before and after the delivery.     Due to difficult manual extraction of the placenta, decision made to treat with Ancef x 2 doses for prophylaxis.      Laci Lynn MD

## 2024-09-13 NOTE — ANESTHESIA PROCEDURE NOTES
Epidural catheter Procedure Note    Pre-Procedure   Staff -        Anesthesiologist:  Lonnie Vega MD       Performed By: anesthesiologist       Location: OB       Pre-Anesthestic Checklist: patient identified, IV checked, site marked, risks and benefits discussed, informed consent, monitors and equipment checked, pre-op evaluation and at physician/surgeon's request  Timeout:       Correct Patient: Yes        Correct Procedure: Yes        Correct Site: Yes        Correct Position: Yes   Procedure Documentation  Procedure: epidural catheter       Patient Position: sitting       Patient Prep/Sterile Barriers: sterile gloves, mask, patient draped       Skin prep: Chloraprep       Local skin infiltrated with 1 mL of 1% lidocaine.        Insertion Site: L3-4. (midline approach).       Technique: LORT saline        Needle Type: Touhy needle       Needle Gauge: 17.        Needle Length (Inches): 3.5        Catheter: 19 G.          Catheter threaded easily.             # of attempts: 1 and  # of redirects:  0    Assessment/Narrative         Paresthesias: No.       Test dose of 3 mL lidocaine 1.5% w/ 1:200,000 epinephrine at 12:27 CDT.         Test dose negative, 3 minutes after injection, for signs of intravascular, subdural, or intrathecal injection.       Insertion/Infusion Method: LORT saline       Aspiration negative for Heme or CSF via Epidural Catheter.    Medication(s) Administered   0.125% Bupivacaine + 2 mcg/mL Fentanyl via CADD (Epidural) - EPIDURAL   9 mL - 9/13/2024 12:30:00 PM   Comments:  Pre-procedure time out completed. Patient in sitting position, the lumbar spine was prepped and draped in sterile fashion. The L3/L4 interspace was identified and local anesthetic was injected for local skin infiltration. A 17 G touhy needle was advanced to the epidural space which was confirmed with the loss of resistance technique at 4 cm. A catheter was then advanced easily into the epidural space. The catheter  "was left at 8 cm at the skin. Negative aspiration of blood and CSF was confirmed. A test dose of 1.5% lidocaine with 1:200,000 epinephrine was injected through the catheter and was negative for intravascular injection. The site was covered with sterile tegaderm and the catheter was secured with tape.       FOR [facility] ([facility]) ONLY:   Pain Team Contact information: please page the Pain Team Via [email or web address]. Search \"Pain\". During daytime hours, please page the attending first. At night please page the resident first.      "

## 2024-09-14 VITALS
OXYGEN SATURATION: 98 % | DIASTOLIC BLOOD PRESSURE: 62 MMHG | SYSTOLIC BLOOD PRESSURE: 108 MMHG | HEART RATE: 60 BPM | RESPIRATION RATE: 16 BRPM | TEMPERATURE: 98 F | BODY MASS INDEX: 24.29 KG/M2 | WEIGHT: 141.5 LBS

## 2024-09-14 LAB — HGB BLD-MCNC: 11.9 G/DL (ref 11.7–15.7)

## 2024-09-14 PROCEDURE — 36415 COLL VENOUS BLD VENIPUNCTURE: CPT | Performed by: OBSTETRICS & GYNECOLOGY

## 2024-09-14 PROCEDURE — 250N000011 HC RX IP 250 OP 636: Performed by: OBSTETRICS & GYNECOLOGY

## 2024-09-14 PROCEDURE — 250N000013 HC RX MED GY IP 250 OP 250 PS 637: Performed by: OBSTETRICS & GYNECOLOGY

## 2024-09-14 PROCEDURE — 85018 HEMOGLOBIN: CPT | Performed by: OBSTETRICS & GYNECOLOGY

## 2024-09-14 RX ADMIN — ACETAMINOPHEN 650 MG: 325 TABLET ORAL at 02:21

## 2024-09-14 RX ADMIN — DOCUSATE SODIUM 100 MG: 100 CAPSULE, LIQUID FILLED ORAL at 08:10

## 2024-09-14 RX ADMIN — CEFAZOLIN 1 G: 1 INJECTION, POWDER, FOR SOLUTION INTRAMUSCULAR; INTRAVENOUS at 02:37

## 2024-09-14 RX ADMIN — ENOXAPARIN SODIUM 40 MG: 40 INJECTION SUBCUTANEOUS at 08:11

## 2024-09-14 RX ADMIN — IBUPROFEN 800 MG: 400 TABLET, FILM COATED ORAL at 08:10

## 2024-09-14 RX ADMIN — ACETAMINOPHEN 650 MG: 325 TABLET ORAL at 08:11

## 2024-09-14 RX ADMIN — IBUPROFEN 800 MG: 400 TABLET, FILM COATED ORAL at 02:21

## 2024-09-14 RX ADMIN — IBUPROFEN 800 MG: 400 TABLET, FILM COATED ORAL at 14:17

## 2024-09-14 RX ADMIN — ACETAMINOPHEN 650 MG: 325 TABLET ORAL at 14:18

## 2024-09-14 ASSESSMENT — ACTIVITIES OF DAILY LIVING (ADL)
ADLS_ACUITY_SCORE: 20
ADLS_ACUITY_SCORE: 26
ADLS_ACUITY_SCORE: 26
ADLS_ACUITY_SCORE: 20
ADLS_ACUITY_SCORE: 26
ADLS_ACUITY_SCORE: 26
ADLS_ACUITY_SCORE: 20
ADLS_ACUITY_SCORE: 26
ADLS_ACUITY_SCORE: 26
ADLS_ACUITY_SCORE: 20
ADLS_ACUITY_SCORE: 26
ADLS_ACUITY_SCORE: 26
ADLS_ACUITY_SCORE: 20

## 2024-09-14 NOTE — PROGRESS NOTES
PPD1  Feels well, no concerns.   /58   Pulse 55   Temp 97.7  F (36.5  C) (Oral)   Resp 16   SpO2 98%   Breastfeeding Unknown    NAD  Abd Soft, NT    PPD1 s/p  with manual extraction of placenta with h/o retained POC in previous pregnancy.  - Desires discharge home today  - Follow-up in 2 weeks for US and appt to evaluate for retained POC  - Resume Lovenox today and continue for 6 weeks.    Kayla Meyers MD

## 2024-09-14 NOTE — DISCHARGE INSTRUCTIONS
Warning Signs after Having a Baby    Keep this paper on your fridge or somewhere else where you can see it.    Call your provider if you have any of these symptoms up to 12 weeks after having your baby.    Thoughts of hurting yourself or your baby  Pain in your chest or trouble breathing  Severe headache not helped by pain medicine  Eyesight concerns (blurry vision, seeing spots or flashes of light, other changes to eyesight)  Fainting, shaking or other signs of a seizure    Call 9-1-1 if you feel that it is an emergency.     The symptoms below can happen to anyone after giving birth. They can be very serious. Call your provider if you have any of these warning signs.    My provider s phone number: _______________________    Losing too much blood (hemorrhage)    Call your provider if you soak through a pad in less than an hour or pass blood clots bigger than a golf ball. These may be signs that you are bleeding too much.    Blood clots in the legs or lungs    After you give birth, your body naturally clots its blood to help prevent blood loss. Sometimes this increased clotting can happen in other areas of the body, like the legs or lungs. This can block your blood flow and be very dangerous.     Call your provider if you:  Have a red, swollen spot on the back of your leg that is warm or painful when you touch it.   Are coughing up blood.     Infection    Call your provider if you have any of these symptoms:  Fever of 100.4 F (38 C) or higher.  Pain or redness around your stitches if you had an incision.   Any yellow, white, or green fluid coming from places where you had stitches or surgery.    Mood Problems (postpartum depression)    Many people feel sad or have mood changes after having a baby. But for some people, these mood swings are worse.     Call your provider right away if you feel so anxious or nervous that you can't care for yourself or your baby.    Preeclampsia (high blood pressure)    Even if you  "didn't have high blood pressure when you were pregnant, you are at risk for the high blood pressure disease called preeclampsia. This risk can last up to 12 weeks after giving birth.     Call your provider if you have:   Pain on your right side under your rib cage  Sudden swelling in the hands and face    Remember: You know your body. If something doesn't feel right, get medical help.     For informational purposes only. Not to replace the advice of your health care provider. Copyright 2020 [employer] [facility]. All rights reserved. Clinically reviewed by Marilee Jefferson, RNC-OB, MSN. Tagged [id number] - Rev .    Postpartum Care at Home With Your Baby: Care Instructions  Overview     After childbirth (postpartum period), your body goes through many changes as you recover. In these weeks after delivery, try to take good care of yourself. Get rest whenever you can and accept help from others.  It may take 4 to 6 weeks to feel like yourself again, and possibly longer if you had a  birth. You may feel sore or very tired as you recover. After delivery, you may continue to have contractions as the uterus returns to the size it was before your pregnancy. You will also have some vaginal bleeding. And you may have pain around the vagina as you heal. Several days after delivery you may also have pain and swelling in your breasts as they fill with milk. There are things you can do at home to help ease these discomforts.  After childbirth, it's common to feel emotional. You may feel irritable, cry easily, and feel happy one minute and sad the next. This is called the \"baby blues.\" Hormone changes are one cause of these emotional changes. These feelings usually get better within a couple of weeks. If they don't, talk to your doctor or midwife.  In the first couple of weeks after you give birth, your doctor or midwife may want to check in with you and make a plan for follow-up care. You will likely have a " complete postpartum visit in the first 3 months after delivery. At that time, your doctor or midwife will check on your recovery and see how you're doing. But if you have questions or concerns before then, you can always call your doctor or midwife.  Follow-up care is a key part of your treatment and safety. Be sure to make and go to all appointments, and call your doctor if you are having problems. It's also a good idea to know your test results and keep a list of the medicines you take.  How can you care for yourself at home?  Taking care of your body  Use pads instead of tampons for bleeding. After birth, you will have bloody vaginal discharge. You may also pass some blood clots that shouldn't be bigger than an egg. Over the next 6 weeks or so, your bleeding should decrease a little every day and slowly change to a pinkish and then whitish discharge.  For cramps or mild pain, try an over-the-counter pain medicine, such as acetaminophen (Tylenol) or ibuprofen (Advil, Motrin). Read and follow all instructions on the label.  To ease pain around the vagina or from hemorrhoids:  Put ice or a cold pack on the area for 10 to 20 minutes at a time. Put a thin cloth between the ice and your skin.  Try sitting in a few inches of warm water (sitz bath) when you can or after bowel movements.  Clean yourself with a gentle squeeze of warm water from a bottle instead of wiping with toilet paper.  Use witch hazel or hemorrhoid pads (such as Tucks).  Try using a cold compress for sore and swollen breasts. And wear a supportive bra that fits.  Ease constipation by drinking plenty of fluids and eating high-fiber foods. Ask your doctor or midwife about over-the-counter stool softeners.  Activity  Rest when you can.  Ask for help from family or friends when you need it.  If you can, have another adult in your home for at least 2 or 3 days after birth.  When you feel ready, try to get some exercise every day. For many people, walking  is a good choice. Don't do any heavy exercise until your doctor or midwife says it's okay.  Ask your doctor or midwife when it is okay to have vaginal sex.  If you don't want to get pregnant, talk to your doctor or midwife about birth control options. You can get pregnant even before your period returns. Also, you can get pregnant while you are breastfeeding.  Talk to your doctor or midwife if you want to get pregnant again. They can talk to you about when it is safe.  Emotional health  It's normal to have some sadness, anxiety, and mood swings after delivery. It may help to talk with a trusted friend or family member. You can also call the Maternal Mental Health Hotline at 3-818-JYG-\[facility]\"" (1-375.924.6190) for support. If these mood changes last more than a couple of weeks, talk to your doctor or midwife.  When should you call for help?  Share this information with your partner, family, or a friend. They can help you watch for warning signs.  Call 911  anytime you think you may need emergency care. For example, call if:    You feel you cannot stop from hurting yourself, your baby, or someone else.     You passed out (lost consciousness).     You have chest pain, are short of breath, or cough up blood.     You have a seizure.   Where to get help 24 hours a day, 7 days a week   If you or someone you know talks about suicide, self-harm, a mental health crisis, a substance use crisis, or any other kind of emotional distress, get help right away. You can:    Call the Suicide and Crisis Lifeline at 178.     Call 3-450-042-TALK (1-559.312.7424).     Text HOME to 699035 to access the Crisis Text Line.   Consider saving these numbers in your phone.  Go to [employer]org for more information or to chat online.  Call your doctor or midwife now or seek immediate medical care if:    You have signs of hemorrhage (too much bleeding), such as:  Heavy vaginal bleeding. This means that you are soaking through one or more pads in an  "hour. Or you pass blood clots bigger than an egg.  Feeling dizzy or lightheaded, or you feel like you may faint.  Feeling so tired or weak that you cannot do your usual activities.  A fast or irregular heartbeat.  New or worse belly pain.     You have signs of infection, such as:  A fever.  Increased pain, swelling, warmth, or redness from an incision or wound.  Frequent or painful urination or blood in your urine.  Vaginal discharge that smells bad.  New or worse belly pain.     You have symptoms of a blood clot in your leg (called a deep vein thrombosis), such as:  Pain in the calf, back of the knee, thigh, or groin.  Swelling in the leg or groin.  A color change on the leg or groin. The skin may be reddish or purplish, depending on your usual skin color.     You have signs of preeclampsia, such as:  Sudden swelling of your face, hands, or feet.  New vision problems (such as dimness, blurring, or seeing spots).  A severe headache.     You have signs of heart failure, such as:  New or increased shortness of breath.  New or worse swelling in your legs, ankles, or feet.  Sudden weight gain, such as more than 2 to 3 pounds in a day or 5 pounds in a week.  Feeling so tired or weak that you cannot do your usual activities.     You had spinal or epidural pain relief and have:  New or worse back pain.  Increased pain, swelling, warmth, or redness at the injection site.  Tingling, weakness, or numbness in your legs or groin.   Watch closely for changes in your health, and be sure to contact your doctor or midwife if:    Your vaginal bleeding isn't decreasing.     You feel sad, anxious, or hopeless for more than a few days.     You are having problems with your breasts or breastfeeding.   Where can you learn more?  Go to https://www.healthwise.net/patiented  Enter Z768 in the search box to learn more about \"Postpartum Care at Home With Your Baby: Care Instructions.\"  Current as of: July 10, 2023               Content " Version: 14.0    3607-5638 [employer].   Care instructions adapted under license by your healthcare professional. If you have questions about a medical condition or this instruction, always ask your healthcare professional. [employer] disclaims any warranty or liability for your use of this information.

## 2024-09-14 NOTE — LACTATION NOTE
Lactation visit with Raven, significant other Salvatore & baby boy Rivera. Hoping to discharge home later today if able. Raven reports feeding is going well so far; she shared Rivera is latching well and feeding frequently. She shared he's been a little sleepy at a couple of the feedings but she feels his latch is more comfortable than her first child's was, in the beginning. She shared she had a good breastfeeding experience with her daughter, who is almost three, but shared she had a tongue and lip tie and it was never revised per their choice. Breastfeeding was initially more sore but over time became more comfortable. She is happy Rivera does not appear to have a tongue or lip tie. She has no current concerns about feeding. We discussed cluster feeding, what it is and when to expect it, The Second Night, satiety cues, feeding cues, and reviewed Feeding Log for home use. Encouraged to review Breastfeeding section in Your Guide to Postpartum &  Care.    Reviewed milk supply and engorgement. Reviewed typical timeline of milk supply initiation and progression over first 3-5 days postpartum. Discussed comfort measures for engorgement, plugged duct treatment, and warning signs of breast infection. General questions answered regarding pumping, when it's helpful and necessary. Reviewed general recommendation to wait to start pumping until breastfeeding is well established unless there are feeding difficulties or engorgement not relieved by feeding baby or hand expression. Discussed introducing a bottle and recommendation to wait for bottle introduction for 3-4 weeks unless baby needs to supplement for medical reasons.    Feeding plan: Recommend unlimited, frequent breast feedings: At least 8 - 12 times every 24 hours. Avoid pacifiers and supplementation with formula unless medically indicated. Encouraged use of feeding log and to record feedings, and void/stool patterns. Raven has a breast pump for home use. Follow up with  [facility]. Reviewed outpatient lactation resources. Raven & Salvatore very appreciative of visit.    Mary Jo Hubbard, RN, BSN, MNN, IBCLC

## 2024-09-14 NOTE — PLAN OF CARE
Goal Outcome Evaluation:      Plan of Care Reviewed With: parent    Overall Patient Progress: improvingOverall Patient Progress: improving         D: VSS, assessments WDL.  I: Pt received complete discharge paperwork and home medications as filled by discharge pharmacy -none. Pt was given times of last dose for all discharge medications in writing on discharge medication sheets. Discharge teaching included home medication, pain management activity restrictions, postpartum cares and symptoms of infection.   A: Discharge outcomes on care plan met. Mother states understanding and comfort with self and baby cares.  P: Pt discharged to home. Pt was discharged with baby, and bands checked at time of discharge. Pt was accompanied by , nurse and baby, and left with personal belongings. Pt to follow up with OB per MD order. Pt had no further questions at this time and no unmet needs were identified.

## 2024-09-14 NOTE — PLAN OF CARE
Patient, spouse, and  transferred to room 428. Bedside report given to Pita MCGOVERN. ID bands double verified. Pt unable to void, bladder scanned for >400. Straight cath for 700. Fundus firm, -2. Patient and  stable upon transfer.

## 2024-09-14 NOTE — PROGRESS NOTES
FOR CONSTIPATION  Take Miralax (or generic equivalent) 17g once daily (one capful) twice a day, until starting to have BMs, then switch to 1 capful a day    Take colace (stool softener) 100mg three times a day     If you develop severe abdominal pain or unable to have a bowel movement you need to go in the emergency room    STENT PULL  You may experience waves of pain and/or nausea for the next 24-72 hrs. You may also experience burning with urination, frequency, urgency, bladder spasms, and blood in the urine. All of this should continue to improve over the next several days. The blood in the urine can last up to two weeks. 1) take ibuprofen (motrin) 600 mg (3 of the 200mg tabs) every 6 hours WITH FOOD for the next 72 hours. 2) drink at least 80 oz fluid (water, juice, Gatorade - NOT tea, coffee, soda pop) daily    Call our office [phone] or go to ER (if after normal office hours) if you develop fever, intractable vomiting, severe/intolerable pain. SURVEY:    You may be receiving a survey from [employer] regarding your visit today. Please complete the survey to enable us to provide the highest quality of care to you and your family. If you cannot score us a very good on any question, please call the office to discuss how we could have made your experience a very good one. Thank you. Patient, spouse, and  transferred to room 428 accompanied by Shannon Feliciano RN. Bedside report received at this time. ID bands double verified. Patient and spouse oriented to room, call light, and plan of care. Safety protocols including safe sleep and bulb syringe use reviewed. Feeding log in new family book reviewed. Encouraged to call with questions/concerns.

## 2024-09-14 NOTE — PLAN OF CARE
Vitals within defined limits. Fundus firm. Lochia scant. Using Tylenol/Motrin for perineal soreness with good relief. Using ice packs/tucks and Benzocaine spray for comfort. Voiding without issues. Up ad monica. Working on breastfeeding  per cues. Encouraged to call with questions/concerns. Spouse at bedside and supportive.

## 2024-09-17 LAB
PATH REPORT.COMMENTS IMP SPEC: NORMAL
PATH REPORT.COMMENTS IMP SPEC: NORMAL
PATH REPORT.FINAL DX SPEC: NORMAL
PATH REPORT.GROSS SPEC: NORMAL
PATH REPORT.MICROSCOPIC SPEC OTHER STN: NORMAL
PATH REPORT.RELEVANT HX SPEC: NORMAL
PHOTO IMAGE: NORMAL

## 2024-09-17 PROCEDURE — 88307 TISSUE EXAM BY PATHOLOGIST: CPT | Mod: 26 | Performed by: PATHOLOGY

## 2025-06-12 NOTE — ANESTHESIA PREPROCEDURE EVALUATION
Anesthesia Pre-Procedure Evaluation    Patient: Raven Matt   MRN: [id number] : 1990        Procedure :           Past Medical History:   Diagnosis Date    Anemia, iron deficiency     Cerebral infarction (H)     Dural sinus thrombosis     Hypothyroidism       Past Surgical History:   Procedure Laterality Date    Egg retrieval      HYSTEROSCOPY      WISDOM TOOTH EXTRACTION        Allergies   Allergen Reactions    Penicillins Hives    Sulfa Antibiotics Hives      Social History     Tobacco Use    Smoking status: Never    Smokeless tobacco: Never   Substance Use Topics    Alcohol use: Not Currently      Wt Readings from Last 1 Encounters:   24 67.6 kg (149 lb)        Anesthesia Evaluation            ROS/MED HX  ENT/Pulmonary:    (-) asthma   Neurologic:  - neg neurologic ROS   (+)          CVA (stroke 10 years ago; taking lovenox during pregnancy (held x 24h prior to epidural placement)),                      Cardiovascular:     (+)  - -   -  - -   Taking blood thinners                                (-) PIH   METS/Exercise Tolerance:     Hematologic:     (+)  anticoagulation therapy, no coagulopathy,             Musculoskeletal:       GI/Hepatic:     (+) GERD,                   Renal/Genitourinary:       Endo:       Psychiatric/Substance Use:       Infectious Disease:       Malignancy:       Other:            Physical Exam    Airway        Mallampati: II   TM distance: > 3 FB   Neck ROM: full     Respiratory Devices and Support         Dental  no notable dental history         Cardiovascular   cardiovascular exam normal          Pulmonary   pulmonary exam normal                OUTSIDE LABS:  CBC:   Lab Results   Component Value Date    WBC 5.5 2024    WBC 5.1 2024    HGB 11.5 (L) 2024    HGB 10.9 (L) 2024    HCT 37.5 2024    HCT 34.8 (L) 2024     2024     2024     BMP:   Lab Results   Component Value Date    CR 0.63 2021     COAGS:  "  Lab Results   Component Value Date    PTT 27 08/28/2024    INR 1.02 08/28/2024    FIBR 433 08/28/2024     POC: No results found for: \"BGM\", \"HCG\", \"HCGS\"  HEPATIC: No results found for: \"ALBUMIN\", \"PROTTOTAL\", \"ALT\", \"AST\", \"GGT\", \"ALKPHOS\", \"BILITOTAL\", \"BILIDIRECT\", \"CADEN\"  OTHER: No results found for: \"PH\", \"LACT\", \"A1C\", \"PRIETO\", \"PHOS\", \"MAG\", \"LIPASE\", \"AMYLASE\", \"TSH\", \"T4\", \"T3\", \"CRP\", \"SED\"    Anesthesia Plan    ASA Status:  3       Anesthesia Type: Epidural.              Consents    Anesthesia Plan(s) and associated risks, benefits, and realistic alternatives discussed. Questions answered and patient/representative(s) expressed understanding.     - Discussed:     - Discussed with:  Patient            Postoperative Care            Comments:    Other Comments: Orders to manage the epidural infusion have been entered, and through coordination with the nurse, we will continute to manage and monitor the patient's labor epidural.  We will continuously be available to adjust as needed thruout the entire L&D process.            Lonnie Vega MD    I have reviewed the pertinent notes and labs in the chart from the past 30 days and (re)examined the patient.  Any updates or changes from those notes are reflected in this note.            # Drug Induced Coagulation Defect: home medication list includes an anticoagulant medication       " with patient